# Patient Record
Sex: FEMALE | Race: BLACK OR AFRICAN AMERICAN
[De-identification: names, ages, dates, MRNs, and addresses within clinical notes are randomized per-mention and may not be internally consistent; named-entity substitution may affect disease eponyms.]

---

## 2022-11-06 ENCOUNTER — HOSPITAL ENCOUNTER (EMERGENCY)
Dept: HOSPITAL 97 - ER | Age: 22
Discharge: HOME | End: 2022-11-06
Payer: SELF-PAY

## 2022-11-06 VITALS — SYSTOLIC BLOOD PRESSURE: 104 MMHG | DIASTOLIC BLOOD PRESSURE: 76 MMHG

## 2022-11-06 VITALS — TEMPERATURE: 98 F

## 2022-11-06 VITALS — OXYGEN SATURATION: 100 %

## 2022-11-06 DIAGNOSIS — R63.0: ICD-10-CM

## 2022-11-06 DIAGNOSIS — E86.0: Primary | ICD-10-CM

## 2022-11-06 LAB
ALBUMIN SERPL BCP-MCNC: 5 G/DL (ref 3.4–5)
ALP SERPL-CCNC: 61 U/L (ref 45–117)
ALT SERPL W P-5'-P-CCNC: 18 U/L (ref 12–78)
AST SERPL W P-5'-P-CCNC: 12 U/L (ref 15–37)
BUN BLD-MCNC: 7 MG/DL (ref 7–18)
GLUCOSE SERPLBLD-MCNC: 81 MG/DL (ref 74–106)
HCT VFR BLD CALC: 47.6 % (ref 36–45)
LYMPHOCYTES # SPEC AUTO: 2 K/UL (ref 0.7–4.9)
MCV RBC: 85.7 FL (ref 80–100)
PMV BLD: 8.9 FL (ref 7.6–11.3)
POTASSIUM SERPL-SCNC: 3.7 MMOL/L (ref 3.5–5.1)
RBC # BLD: 5.55 M/UL (ref 3.86–4.86)
TSH SERPL DL<=0.05 MIU/L-ACNC: 1.77 UIU/ML (ref 0.36–3.74)

## 2022-11-06 PROCEDURE — 87088 URINE BACTERIA CULTURE: CPT

## 2022-11-06 PROCEDURE — 81003 URINALYSIS AUTO W/O SCOPE: CPT

## 2022-11-06 PROCEDURE — 87186 SC STD MICRODIL/AGAR DIL: CPT

## 2022-11-06 PROCEDURE — 87086 URINE CULTURE/COLONY COUNT: CPT

## 2022-11-06 PROCEDURE — 81015 MICROSCOPIC EXAM OF URINE: CPT

## 2022-11-06 PROCEDURE — 84443 ASSAY THYROID STIM HORMONE: CPT

## 2022-11-06 PROCEDURE — 36415 COLL VENOUS BLD VENIPUNCTURE: CPT

## 2022-11-06 PROCEDURE — 87804 INFLUENZA ASSAY W/OPTIC: CPT

## 2022-11-06 PROCEDURE — 99284 EMERGENCY DEPT VISIT MOD MDM: CPT

## 2022-11-06 PROCEDURE — 96360 HYDRATION IV INFUSION INIT: CPT

## 2022-11-06 PROCEDURE — 85025 COMPLETE CBC W/AUTO DIFF WBC: CPT

## 2022-11-06 PROCEDURE — 85652 RBC SED RATE AUTOMATED: CPT

## 2022-11-06 PROCEDURE — 87077 CULTURE AEROBIC IDENTIFY: CPT

## 2022-11-06 PROCEDURE — 96361 HYDRATE IV INFUSION ADD-ON: CPT

## 2022-11-06 PROCEDURE — 80053 COMPREHEN METABOLIC PANEL: CPT

## 2022-11-06 NOTE — XMS REPORT
Continuity of Care Document

                           Created on:2022



Patient:MICHA CASTILLO

Sex:Female

:2000

External Reference #:[id number]





Demographics







                          Address                   [address]



                                                    [place] [address]

 

                          Home Phone                [phone]

 

                          Email Address             [address]

 

                          Preferred Language        English

 

                          Marital Status            Unknown

 

                          Temple Affiliation     Unknown

 

                          Race                      Unknown

 

                          Additional Race(s)        Unavailable

 

                          Ethnic Group              Unknown









Author







                          Organization              [facility]

t

 

                          Address                   [address] [place] [address] [address]



                                                    [place] [address]

 

                          Phone                     [phone]









Support







                Name            Relationship    Address         Phone

 

                FREDY MELO     Grandparent     [address]        Unavailable



                                                [place] [address] 









Care Team Providers







                    Name                Role                Phone

 

                    PCP, PATIENT DOES NOT HAVE A Primary Care Physician Unavaila

kaila Sevilla          Attending Clinician Unavailable

 

                    DAISY ROGERS     Attending Clinician Unavailable

 

                    Di          Admitting Clinician Unavailable









Payers







           Payer Name Policy Type Policy Number Effective Date Expiration Date S

ource







Problems







       Condition Condition Condition Status Onset  Resolution Last   Treating Co

mments 

Source



       Name   Details Category        Date   Date   Treatment Clinician        



                                                 Date                 

 

       Eczema Eczema Problem Active                              [facility]



                                   9-15                               da



                                   00:00:                             [facility]



                                   00                                 al



                                                                      Health



                                                                      [facility]



                                                                      h



                                                                      Program







Allergies, Adverse Reactions, Alerts







       Allergy Allergy Status Severity Reaction(s) Onset  Inactive Treating Comm

ents 

Source



       Name   Type                        Date   Date   Clinician        

 

       NO KNOWN Drug   Active                                           [facility]



       ALLERGIE Class                                                   [facility]







Social History







                Smoking Status  Start Date      Stop Date       Source

 

                Never Smoker                                    [facility]

[facility]







Medications







       Ordered Filled Start  Stop   Current Ordering Indication Dosage Frequency

 Signature

                    Comments            Components          Source



     Medication Medication Date Date Medication? Clinician                (SIG) 

          



     Name Name                                                   

 

     hydrocortis hydrocortis           No                       hydrocorti      

     [facility]



     one 1 % one 1 %                                    sone 1 %           da



     topical topical                                    topical           [facility]

p



     cream APPLY cream APPLY                                    cream           

al



     THIN LAYER THIN LAYER                                    APPLY THIN        

   Health



     TOPICALLY TOPICALLY                                    LAYER           Outr

eac



     TO THE TO THE                                    TOPICALLY           h



     AFFECTED AFFECTED                                    TO THE           Progr

am



     AREA TWICE AREA TWICE                                    AFFECTED          

 



     DAILY DAILY                                    AREA TWICE           



                                                  DAILY           

 

     mirtazapine mirtazapine           No                       mirtazapin      

     [facility]



     30 mg 30 mg                                    e 30 mg           da



     tablet TAKE tablet TAKE                                    tablet          

 [facility]



     1 TABLET BY 1 TABLET BY                                    TAKE 1          

 al



     MOUTH EVERY MOUTH EVERY                                    TABLET BY       

    Health



     DAY AT DAY AT                                    MOUTH           [facility]



     BEDTIME BEDTIME                                    EVERY DAY           h



                                                  AT BEDTIME           Program

 

     triamcinolo triamcinolo           No                       triamcinol      

     [facility]



     ne   ne                                      one            da



     acetonide acetonide                                    acetonide           

[facility]



     0.1 % 0.1 %                                    0.1 %           al



     topical topical                                    topical           Health



     ointment ointment                                    ointment           Out

reac



     APPLY A APPLY A                                    APPLY A           h



     THIN LAYER THIN LAYER                                    THIN LAYER        

   Program



     TO THE TO THE                                    TO THE           



     AFFECTED AFFECTED                                    AFFECTED           



     AREA(S) BY AREA(S) BY                                    AREA(S) BY        

   



     TOPICAL TOPICAL                                    TOPICAL           



     ROUTE 2 ROUTE 2                                    ROUTE 2           



     TIMES PER TIMES PER                                    TIMES PER           



     DAY use DAY use                                    DAY use           



     only if only if                                    only if           



     hydrocortis hydrocortis                                    hydrocorti      

     



     one not one not                                    sone not           



     effective effective                                    effective           

 

     Abilify 5 Abilify 5           No             1    Q1D  Abilify 5           

[facility]



     mg tablet mg tablet                                    mg tablet           

da



     Take 1 Take 1                                    Take 1           [facility]



     tablet tablet                                    tablet           al



     every day every day                                    every day           

Health



     by oral by oral                                    by oral           [facility]

c



     route in route in                                    route in           h



     the morning the morning                                    the            P

rogram



     for 30 for 30                                    morning           



     days. days.                                    for 30           



                                                  days.           

 

     fluconazole fluconazole           No                       fluconazol      

     [facility]



     150 mg 150 mg                                    e 150 mg           da



     tablet TAKE tablet TAKE                                    tablet          

 [facility]



     1 TABLET BY 1 TABLET BY                                    TAKE 1          

 al



     MOUTH EVERY MOUTH EVERY                                    TABLET BY       

    Health



     OTHER DAY OTHER DAY                                    MOUTH           Outr

eac



     FOR 4 DAYS FOR 4 DAYS                                    EVERY           h



                                                  OTHER DAY           Program



                                                  FOR 4 DAYS           

 

     hydrocortis hydrocortis           No                       hydrocorti      

     [facility]



     one 1 % one 1 %                                    sone 1 %           da



     topical topical                                    topical           [facility]

p



     cream APPLY cream APPLY                                    cream           

al



     THIN LAYER THIN LAYER                                    APPLY THIN        

   Health



     TOPICALLY TOPICALLY                                    LAYER           Outr

eac



     TO THE TO THE                                    TOPICALLY           h



     AFFECTED AFFECTED                                    TO THE           Progr

am



     AREA TWICE AREA TWICE                                    AFFECTED          

 



     DAILY DAILY                                    AREA TWICE           



                                                  DAILY           

 

     mirtazapine mirtazapine           No                       mirtazapin      

     [facility]



     30 mg 30 mg                                    e 30 mg           da



     tablet TAKE tablet TAKE                                    tablet          

 [facility]



     1 TABLET BY 1 TABLET BY                                    TAKE 1          

 al



     MOUTH EVERY MOUTH EVERY                                    TABLET BY       

    Health



     DAY AT DAY AT                                    MOUTH           [facility]



     BEDTIME BEDTIME                                    EVERY DAY           h



                                                  AT BEDTIME           Program

 

     triamcinolo triamcinolo           No                       triamcinol      

     [facility]



     ne   ne                                      one            da



     acetonide acetonide                                    acetonide           

[facility]



     0.1 % 0.1 %                                    0.1 %           al



     topical topical                                    topical           Health



     ointment ointment                                    ointment           Out

reac



     APPLY A APPLY A                                    APPLY A           h



     THIN LAYER THIN LAYER                                    THIN LAYER        

   Program



     TO THE TO THE                                    TO THE           



     AFFECTED AFFECTED                                    AFFECTED           



     AREA(S) BY AREA(S) BY                                    AREA(S) BY        

   



     TOPICAL TOPICAL                                    TOPICAL           



     ROUTE 2 ROUTE 2                                    ROUTE 2           



     TIMES PER TIMES PER                                    TIMES PER           



     DAY use DAY use                                    DAY use           



     only if only if                                    only if           



     hydrocortis hydrocortis                                    hydrocorti      

     



     one not one not                                    sone not           



     effective effective                                    effective           

 

     hydrocortis hydrocortis           No                       hydrocorti      

     [facility]



     one 1 % one 1 %                                    sone 1 %           da



     topical topical                                    topical           [facility]

p



     cream APPLY cream APPLY                                    cream           

al



     A THIN A THIN                                    APPLY A           Health



     LAYER TO LAYER TO                                    THIN LAYER           O

[facility]



     THE  THE                                     TO THE           h



     AFFECTED AFFECTED                                    AFFECTED           Pro

gram



     AREA(S) BY AREA(S) BY                                    AREA(S) BY        

   



     TOPICAL TOPICAL                                    TOPICAL           



     ROUTE 2 ROUTE 2                                    ROUTE 2           



     TIMES PER TIMES PER                                    TIMES PER           



     DAY  DAY                                     DAY            

 

     triamcinolo triamcinolo           No                       triamcinol      

     [facility]



     ne   ne                                      one            da



     acetonide acetonide                                    acetonide           

[facility]



     0.1 % 0.1 %                                    0.1 %           al



     topical topical                                    topical           Health



     ointment ointment                                    ointment           Out

reac



     APPLY A APPLY A                                    APPLY A           h



     THIN LAYER THIN LAYER                                    THIN LAYER        

   Program



     TO THE TO THE                                    TO THE           



     AFFECTED AFFECTED                                    AFFECTED           



     AREA(S) BY AREA(S) BY                                    AREA(S) BY        

   



     TOPICAL TOPICAL                                    TOPICAL           



     ROUTE 2 ROUTE 2                                    ROUTE 2           



     TIMES PER TIMES PER                                    TIMES PER           



     DAY use DAY use                                    DAY use           



     only if only if                                    only if           



     hydrocortis hydrocortis                                    hydrocorti      

     



     one not one not                                    sone not           



     effective effective                                    effective           

 

     hydrocortis hydrocortis           No                       hydrocorti      

     [facility]



     one 1 % one 1 %                                    sone 1 %           da



     topical topical                                    topical           [facility]

p



     cream APPLY cream APPLY                                    cream           

al



     THIN LAYER THIN LAYER                                    APPLY THIN        

   Health



     TOPICALLY TOPICALLY                                    LAYER           Outr

eac



     TO THE TO THE                                    TOPICALLY           h



     AFFECTED AFFECTED                                    TO THE           Progr

am



     AREA TWICE AREA TWICE                                    AFFECTED          

 



     DAILY DAILY                                    AREA TWICE           



                                                  DAILY           

 

     mirtazapine mirtazapine           No             1    Q1D  mirtazapin      

     [facility]



     30 mg 30 mg                                    e 30 mg           da



     tablet Take tablet Take                                    tablet          

 [facility]



     1 tablet 1 tablet                                    Take 1           al



     every day every day                                    tablet           Hea

lth



     by oral by oral                                    every day           Outr

eac



     route at route at                                    by oral           h



     bedtime for bedtime for                                    route at        

   Program



     30 days. 30 days.                                    bedtime           



                                                  for 30           



                                                  days.           

 

     triamcinolo triamcinolo           No                       triamcinol      

     [facility]



     ne   ne                                      one            da



     acetonide acetonide                                    acetonide           

[facility]



     0.1 % 0.1 %                                    0.1 %           al



     topical topical                                    topical           Health



     ointment ointment                                    ointment           Out

reac



     APPLY A APPLY A                                    APPLY A           h



     THIN LAYER THIN LAYER                                    THIN LAYER        

   Program



     TO THE TO THE                                    TO THE           



     AFFECTED AFFECTED                                    AFFECTED           



     AREA(S) BY AREA(S) BY                                    AREA(S) BY        

   



     TOPICAL TOPICAL                                    TOPICAL           



     ROUTE 2 ROUTE 2                                    ROUTE 2           



     TIMES PER TIMES PER                                    TIMES PER           



     DAY use DAY use                                    DAY use           



     only if only if                                    only if           



     hydrocortis hydrocortis                                    hydrocorti      

     



     one not one not                                    sone not           



     effective effective                                    effective           

 

     hydrocortis hydrocortis           No                       hydrocorti      

     [facility]



     one 1 % one 1 %                                    sone 1 %           da



     topical topical                                    topical           [facility]

p



     cream APPLY cream APPLY                                    cream           

al



     THIN LAYER THIN LAYER                                    APPLY THIN        

   Health



     TOPICALLY TOPICALLY                                    LAYER           Outr

eac



     TO THE TO THE                                    TOPICALLY           h



     AFFECTED AFFECTED                                    TO THE           Progr

am



     AREA TWICE AREA TWICE                                    AFFECTED          

 



     DAILY DAILY                                    AREA TWICE           



                                                  DAILY           

 

     mirtazapine mirtazapine           No                       mirtazapin      

     [facility]



     30 mg 30 mg                                    e 30 mg           da



     tablet TAKE tablet TAKE                                    tablet          

 [facility]



     1 TABLET BY 1 TABLET BY                                    TAKE 1          

 al



     MOUTH EVERY MOUTH EVERY                                    TABLET BY       

    Health



     DAY AT DAY AT                                    MOUTH           [facility]



     BEDTIME BEDTIME                                    EVERY DAY           h



                                                  AT BEDTIME           Program

 

     triamcinolo triamcinolo           No                       triamcinol      

     [facility]



     ne   ne                                      one            da



     acetonide acetonide                                    acetonide           

[facility]



     0.1 % 0.1 %                                    0.1 %           al



     topical topical                                    topical           Health



     ointment ointment                                    ointment           Out

reac



     APPLY A APPLY A                                    APPLY A           h



     THIN LAYER THIN LAYER                                    THIN LAYER        

   Program



     TO THE TO THE                                    TO THE           



     AFFECTED AFFECTED                                    AFFECTED           



     AREA(S) BY AREA(S) BY                                    AREA(S) BY        

   



     TOPICAL TOPICAL                                    TOPICAL           



     ROUTE 2 ROUTE 2                                    ROUTE 2           



     TIMES PER TIMES PER                                    TIMES PER           



     DAY use DAY use                                    DAY use           



     only if only if                                    only if           



     hydrocortis hydrocortis                                    hydrocorti      

     



     one not one not                                    sone not           



     effective effective                                    effective           

 

     Diflucan Diflucan           No             1    Q2D  Diflucan           Mat

[facility]



     150 mg 150 mg                                    150 mg           da



     tablet Take tablet Take                                    tablet          

 [facility]



     1 tablet 1 tablet                                    Take 1           al



     every other every other                                    tablet          

 Health



     day by oral day by oral                                    every           

[facility]



     route for 4 route for 4                                    other day       

    h



     days. days.                                    by oral           Program



                                                  route for           



                                                  4 days.           







Vital Signs







             Vital Name   Observation Time Observation Value Comments     Source

 

             BP Systolic  2022-10-11 00:00:00 102 mm[Hg]                [facility]



                                                                 [facility]



                                                                 Program

 

             Body Weight  2022-10-11 00:00:00 117 [facility]_av]               [facility]



                                                                 [facility]



                                                                 Program

 

             BP Diastolic 2022-10-11 00:00:00 74 mm[facility]Hg]                 [facility]

[facility]



                                                                 [facility]



                                                                 Program

 

             Height       2022-10-11 00:00:00 65 [in_i]                 [facility]

[facility]



                                                                 [facility]



                                                                 Program

 

             BMI (Body Mass 2022-10-11 00:00:00 19.5 kg/m2                [facility]

[facility]



             Index)                                              [facility] Outreach



                                                                 Program

 

             BP Diastolic 2022-10-05 00:00:00 70 mm[facility]Hg]                 [facility]



                                                                 [facility]



                                                                 Program

 

             Height       2022-10-05 00:00:00 65 [in_i]                 [facility]

[facility]



                                                                 [facility]



                                                                 Program

 

             BMI (Body Mass 2022-10-05 00:00:00 20 kg/m2                  [facility]

[facility]



             Index)                                              [facility] Outreach



                                                                 Program

 

             BP Systolic  2022-10-05 00:00:00 101 mm[Hg]                [facility]

[facility]



                                                                 Health Outreach



                                                                 Program

 

             Body Weight  2022-10-05 00:00:00 1920 [[facility]av]              [facility]



                                                                 [facility]



                                                                 Program

 

             BP Diastolic 2022 00:00:00 79 mm[Hg]                 [facility]



                                                                 [facility]



                                                                 Program

 

             Height       2022 00:00:00 65 [in_i]                 [facility]



                                                                 [facility]



                                                                 Program

 

             BMI (Body Mass 2022 00:00:00 19.1 kg/m2                [facility]

[facility]



             Index)                                              Health Outreach



                                                                 Program

 

             BP Systolic  2022 00:00:00 114 mm[Hg]                [facility]



                                                                 [facility]



                                                                 Program

 

             Body Weight  2022 00:00:00 1840 [oz_av]              [facility]



                                                                 [facility]



                                                                 Program







Procedures







                Procedure       Date / Time Performed Performing Clinician Sourc

e

 

                US, pelvis, complete 2022-10-11 00:00:00                 Kingsley magdaleno [facility]



                                                                Health Outreach



                                                                Program

 

                US, thyroid     2022-10-05 00:00:00                 Debbi [facility]

[facility]



                                                                Health Outreach



                                                                Program







Plan of Care







             Planned Activity Planned Date Details      Comments     Source

 

             Diagnostic Test 2022-10-11   HCG, intact + beta              [facility]

rda



             Pending      00:00:00     subunit, quant,              [facility]

alth



                                       serum or plasma              Outreach Pro

gram



                                       [code = HCG, intact              



                                       + beta subunit,              



                                       quant, serum or              



                                       plasma]                   

 

             Diagnostic Test 2022-10-11   prolactin, serum              [place]

a



             Pending      00:00:00     [code = prolactin,              [facility]

 Health



                                       serum]                    [facility] Progra

m

 

             Diagnostic Test 2022-10-11   TSH + free T4, serum              [facility]

asa



             Pending      00:00:00     [code = TSH + free              [facility]

 Health



                                       T4, serum]                [facility]

m

 

             Diagnostic Test 2022-10-11   HbA1c (hemoglobin              [place]

da



             Pending      00:00:00     A1c), blood [code =              [facility]

l [facility]



                                       HbA1c (hemoglobin              Outreach P

rogram



                                       A1c), blood]              

 

             Diagnostic Test 2022-10-11   dhea-sulfate, serum              [place]

[place]



             Pending      00:00:00     [code =                   [facility]

h



                                       dhea-sulfate, serum]              [facility]

h Program

 

             Diagnostic Test 2022-10-11   insulin, serum [code              [facility]

asa



             Pending      00:00:00     = insulin, serum]              [facility] 

Health



                                                                 [facility]gra

m

 

             Diagnostic Test 2022-10-11   testosterone, free +              [facility]

asa



             Pending      00:00:00     total, serum [code =              [facility]

al Health



                                       testosterone, free +              [facility]

h Program



                                       total, serum]              

 

             Diagnostic Test 2022-10-11   HIV 1 + 2,                [facility]



             Pending      00:00:00     meaningful use set              [facility]

 [facility]



                                       [code = HIV 1 + 2,              Outreach 

Program



                                       meaningful use set]              

 

             Future Appointment 2022   Nathan Cardona, [address]              [place]

[place]



                          00:00:00     [place] [address]              Outreach

 Program







Encounters







        Start   End     Encounter Admission Attending Care    Care    Encounter 

Source



        Date/Time Date/Time Type    Type    Clinicians Facility Department ID   

   

 

        2022 Outpatient         [facility]   1205

 [place]



        00:00:00 00:00:00                                         [id number]   da



                                                                        [facility]



                                                                        al



                                                                        Health



                                                                        Outreac



                                                                        h



                                                                        Program

 

        2022-10-27 2022-10-27 Outpatient         Ngen[facility]o [facility]   1205

34 [place]



        00:00:00 00:00:00                                         [id number]   da



                                                                        [facility]



                                                                        al



                                                                        Health



                                                                        Outreac



                                                                        h



                                                                        Program

 

        2022-10-27 2022-10-27 Nathan SHELBY                 [facility]   [place] -    [id number] 

[place]



        00:00:00 00:00:00 MD Brendan:                         Debbi conley



                        [address]                            [facility]         [facility]

p



                        [place]                          HOP - MEHOP         al



                        [facility]



                        [address]                                         h



                        , Ph.                                           Program



                        ([phone])                                           



                        [phone]                                         

 

        2022-10-11 2022-10-11 Outpatient         Nguyen_Annieo MEHOP   MEHOP   1205

 [place]



        00:00:00 00:00:00                                         [id number]   da



                                                                        [facility]



                                                                        al



                                                                        Health



                                                                        Outreac



                                                                        



                                                                        Program

 

        2022-10-11 2022-10-11 Kelsi                 [facility]   [place] -    [id number] M

[place]



        00:00:00 00:00:00 Josefina                             Debbi Bedoya,                         [facility]         [facility]

p



                        NP: [address]                         HOP - MEHOP         al



                        [address],                         OB GYN [facility]



                        [address]                                         [place] gu[place]



                        , Ph.                                           



                        ([phone])                                           



                        [phone]                                         

 

        2022-10-05 2022-10-05 Outpatient         Nguyen_Annieo MEHOP   MEHOP   1205

 [place]



        00:00:00 00:00:00                                         [id number]   da



                                                                        [facility]



                                                                        al



                                                                        Health



                                                                        [facility]



                                                                        [facility]

 

        2022-10-05 2022-10-05 Heath                     [facility]   [place] -    [id number] M

[place]



        00:00:00 00:00:00 Debbi Gallegos



                        APRN-NP-C:                         [facility]         Epi

scop



                        [address]                            [facility] - MEHOP         [facility]



                        [address]                                         [place]





                        , Ph.                                           



                        ([phone])                                           



                        2022 Outpatient         Nguyen_Tho MEHOP   MEHOP   1205

 [place]



        00:00:00 00:00:00                                         [id number]   da



                                                                        [facility]



                                                                        al



                                                                        Health



                                                                        Outreac



                                                                        



                                                                        Program

 

        [facility]   [place] [facility]    [id number] 

[place]



        00:00:00 00:00:00 MD Brendan:                         Debbi conley



                        17[address]                            [facility]         [facility]

p



                        [place]                          HOP - MEHOP         al



                        [facility]



                        [address]                                         h



                        , Ph.                                           Program



                        ([phone])                                           



                        2022 Outpatient         [facility]   [address]

 [place]



        00:00:00 00:00:00                                         [id number]   da



                                                                        [facility]



                                                                        [facility]



                                                                        Program

 

        2022 [facility]    [id number] M

[place]



        00:00:00 00:00:00 Debbi Gallegos         da



                        APRN-NP-C:                         [facility]         Epi

scop



                        [address]                            [facility]



                        Ave, [facility]



                        [address]                                         [facility]



                        , Ph.                                           



                        ([phone])                                           



                        2022 Outpatient         [facility]   1205

 [place]



        00:00:00 00:00:00                                         [id number]   da



                                                                        [facility]



                                                                        [facility]



                                                                        Program

 

        2022 Outpatient         [facility]   1205

202 [place]



        00:00:00 00:00:00                                         [id number]   da



                                                                        [facility]



                                                                        [facility]



                                                                        Program

 

        2022 Emergency X       DAISY ROGERS [facility]    ERT     [id number]

[id number] [facility]



        21:40:00 01:50:00                                                 [facility]







Results







           Test Description Test Time  Test Comments Results    Result Comments 

Source









                    Free T4 and TSH panel - Serum or Plasma 2022-10-12 00:00:00 









                      Test Item  Value      Reference Range Interpretation Comme

nts









             Thyrotropin [Units/volume] in Serum or Plasma by 0.544 uIU/mL 0.450

-4.500               



             Detection limit <= 0.005 mIU/L (test code = 34098-1)               

                         

 

             Thyroxine (T4) free [Mass/volume] in Serum or Plasma 0.97 NG/dL   0

.82-1.77                 



             (test code = 3024-7)                                        



[facility] Program[facility] total panel 
[Mass/volume] - Serum or Plasma2022-10-12 00:00:00





             Test Item    Value        Reference Range Interpretation Comments

 

             Testosterone [Mass/volume] in Serum 26 NG/dL     13-71             

        



             or Plasma (test code = 2986-8)                                     

   

 

             Testosterone Free [Mass/volume] in 1.6 pg/mL    0.0-4.2            

       



             Serum or Plasma (test code =                                       

 



             2991-8)                                             



[facility] 
Blood2022-10-12 00:00:00





             Test Item    Value        Reference Range Interpretation Comments

 

             Hemoglobin A1c/Hemoglobin.total in 5.3 %        4.8-5.6            

       



             Blood (test code = 4548-4)                                        



[facility]andDehydroepiandrosterone sulfate (DHEA-
S) [Mass/volume] in Serum or Plasma2022-10-12 00:00:00





             Test Item    Value        Reference Range Interpretation Comments

 

             Dehydroepiandrosterone sulfate 205.0 ug/dL  110.0-431.7            

   



             (DHEA-S) [Mass/volume] in Serum                                    

    



             or Plasma (test code = 2191-5)                                     

   



[facility]andChoriogonadotropin.intact+Beta 
subunit [Units/volume] in Serum or Plasma2022-10-12 00:00:00





             Test Item    Value        Reference Range Interpretation Comments

 

             Choriogonadotropin.intact+Beta subunit <1                          

           



             [Units/volume] in Serum or Plasma (test                            

            



             code = 72423-1)                                        



[facility]andProlactin [Mass/volume] in Serum or 
Plasma2022-10-12 00:00:00





             Test Item    Value        Reference Range Interpretation Comments

 

             Prolactin [Mass/volume] in Serum 14.7 NG/mL   4.8-23.3             

     



             or Plasma (test code = 2842-3)                                     

   



[facility]andHIV 1 and 2 tests - Meaningful Use 
set2022-10-12 00:00:00





             Test Item    Value        Reference Range Interpretation Comments

 

             HIV 1+2 Ab+HIV1 p24 Ag non reactive non reactive              



             [Presence] in Serum or Plasma by                                   

     



             Immunoassay (test code =                                        



             36484-4)                                            



[facility]andInsulin [Units/volume] in Serum or 
Plasma2022-10-12 00:00:00





             Test Item    Value        Reference Range Interpretation Comments

 

             Insulin [Units/volume] in Serum or 2.3 uIU/mL   2.6-24.9     L     

       



             Plasma (test code = 31925-1)                                       

 



[facility]regnancy test, urine2022-10-05 
14:34:48





             Test Item    Value        Reference Range Interpretation Comments

 

             HCG (test code = HCG) negative                               



[facility] Programpregnancy test, urine2022-10-05 
14:34:48





             Test Item    Value        Reference Range Interpretation Comments

 

             HCG (test code = HCG) negative                               



[facility] Programpregnancy test, urine2022-10-05 
14:34:48





             Test Item    Value        Reference Range Interpretation Comments

 

             HCG (test code = HCG) negative                               



[facility] ProgramUrinalysis macro (dipstick) panel - 
Urine2022-10-05 14:34:43





             Test Item    Value        Reference Range Interpretation Comments

 

             Leukocytes (test code = Leukocytes) -                              

        

 

             Nitrite (test code = Nitrite) -                                    

  

 

             Urobilinogen (test code = -                                      



             Urobilinogen)                                        

 

             Protein (test code = Protein) -                                    

  

 

             pH (test code = pH) 8.0                                    

 

             Blood (test code = Blood) -                                      

 

             Specific Gravity (test code = Specific 1.015                       

           



             Gravity)                                            

 

             Ketone (test code = Ketone) -                                      

 

             Bilirubin (test code = Bilirubin) -                                

      

 

             Glucose (test code = Glucose) -                                    

  

 

             Appearance (test code = Appearance) clear                          

        

 

             Color (test code = Color) yellow                                 



[facility]andUrinalysis macro (dipstick) panel - 
Urine2022-10-05 14:34:43





             Test Item    Value        Reference Range Interpretation Comments

 

             Leukocytes (test code = Leukocytes) -                              

        

 

             Nitrite (test code = Nitrite) -                                    

  

 

             Urobilinogen (test code = -                                      



             Urobilinogen)                                        

 

             Protein (test code = Protein) -                                    

  

 

             pH (test code = pH) 8.0                                    

 

             Blood (test code = Blood) -                                      

 

             Specific Gravity (test code = Specific 1.015                       

           



             Gravity)                                            

 

             Ketone (test code = Ketone) -                                      

 

             Bilirubin (test code = Bilirubin) -                                

      

 

             Glucose (test code = Glucose) -                                    

  

 

             Appearance (test code = Appearance) clear                          

        

 

             Color (test code = Color) yellow                                 



[facility]and[facility]inalysis macro (dipstick) panel - 
Urine2022-10-05 14:34:43





             Test Item    Value        Reference Range Interpretation Comments

 

             Leukocytes (test code = Leukocytes) -                              

        

 

             Nitrite (test code = Nitrite) -                                    

  

 

             Urobilinogen (test code = -                                      



             Urobilinogen)                                        

 

             Protein (test code = Protein) -                                    

  

 

             pH (test code = pH) 8.0                                    

 

             Blood (test code = Blood) -                                      

 

             Specific Gravity (test code = Specific 1.015                       

           



             Gravity)                                            

 

             Ketone (test code = Ketone) -                                      

 

             Bilirubin (test code = Bilirubin) -                                

      

 

             Glucose (test code = Glucose) -                                    

  

 

             Appearance (test code = Appearance) clear                          

        

 

             Color (test code = Color) yellow                                 



[facility] Auto Differential panel - Blood
2022 00:00:00





             Test Item    Value        Reference Range Interpretation Comments

 

             Leukocytes [#/volume] in Blood 4.3 x10e3/uL 3.4-10.8               

   



             by Automated count (test code =                                    

    



             6690-2)                                             

 

             Erythrocytes [#/volume] in 4.86 x10e6/uL 3.77-5.28                 



             Blood by Automated count (test                                     

   



             code = 789-8)                                        

 

             Hemoglobin [Mass/volume] in 13.4 g/dL    11.1-15.9                 



             Blood (test code = 718-7)                                        

 

             Hematocrit [Volume Fraction] of 42.5 %       34.0-46.6             

    



             Blood by Automated count (test                                     

   



             code = 4544-3)                                        

 

             Erythrocyte mean corpuscular 87 fL        79-97                    

 



             volume [Entitic volume] by                                        



             Automated count (test code =                                       

 



             787-2)                                              

 

             Erythrocyte mean corpuscular 27.6 pg      26.6-33.0                

 



             hemoglobin [Entitic mass] by                                       

 



             Automated count (test code =                                       

 



             785-6)                                              

 

             Erythrocyte mean corpuscular 31.5 g/dL    31.5-35.7                

 



             hemoglobin concentration                                        



             [Mass/volume] by Automated                                        



             count (test code = 786-4)                                        

 

             Erythrocyte distribution width 13.5 %       11.7-15.4              

   



             [Ratio] by Automated count                                        



             (test code = 788-0)                                        

 

             Platelets [#/volume] in Blood 211 x10e3/uL 150-450                 

  



             by Automated count (test code =                                    

    



             777-3)                                              

 

             Neutrophils/100 leukocytes in 44 %         not estab.              

  



             Blood by Automated count (test                                     

   



             code = 770-8)                                        

 

             Lymphocytes/100 leukocytes in 39 %         not estab.              

  



             Blood by Automated count (test                                     

   



             code = 736-9)                                        

 

             Monocytes/100 leukocytes in 8 %          not estab.                



             Blood by Automated count (test                                     

   



             code = 5905-5)                                        

 

             Eosinophils/100 leukocytes in 8 %          not estab.              

  



             Blood by Automated count (test                                     

   



             code = 713-8)                                        

 

             Basophils/100 leukocytes in 1 %          not estab.                



             Blood by Automated count (test                                     

   



             code = 706-2)                                        

 

             immature cells (test code = np                                     



             immature cells)                                        

 

             Neutrophils [#/volume] in Blood 1.9 x10e3/uL 1.4-7.0               

    



             by Automated count (test code =                                    

    



             751-8)                                              

 

             Lymphocytes [#/volume] in Blood 1.7 x10e3/uL 0.7-3.1               

    



             by Automated count (test code =                                    

    



             731-0)                                              

 

             Monocytes [#/volume] in Blood 0.4 x10e3/uL 0.1-0.9                 

  



             by Automated count (test code =                                    

    



             742-7)                                              

 

             Eosinophils [#/volume] in Blood 0.3 x10e3/uL 0.0-0.4               

    



             by Automated count (test code =                                    

    



             711-2)                                              

 

             Basophils [#/volume] in Blood 0.0 x10e3/uL 0.0-0.2                 

  



             by Automated count (test code =                                    

    



             704-7)                                              

 

             Immature granulocytes/100 0 %          not estab.                



             leukocytes in Blood by                                        



             Automated count (test code =                                       

 



             62420-8)                                            

 

             Immature granulocytes 0.0 x10e3/uL 0.0-0.1                   



             [#/volume] in Blood by                                        



             Automated count (test code =                                       

 



             65596-4)                                            

 

             Nucleated erythrocytes/100 np                                     



             leukocytes [Ratio] in Blood by                                     

   



             Automated count (test code =                                       

 



             74869-9)                                            

 

             Morphology [Interpretation] in np                                  

   



             Blood Narrative (test code =                                       

 



             74818-9)                                            



[facility] - 
Serum or [id number] 00:00:00





             Test Item    Value        Reference Range Interpretation Comments

 

             Glucose [Mass/volume] in 70 mg/dL     65-99                     



             Serum or Plasma (test code =                                       

 



             2345-7)                                             

 

             Urea nitrogen [Mass/volume] 12 mg/dL     6-20                      



             in Serum or Plasma (test code                                      

  



             = 3094-0)                                           

 

             Creatinine [Mass/volume] in 0.75 mg/dL   0.57-1.00                 



             Serum or Plasma (test code =                                       

 



             2160-0)                                             

 

             eGFR (test code = eGFR) 115 mL/min/1.73 >59                       

 

             Urea nitrogen/Creatinine 16           9-23                      



             [Mass Ratio] in Serum or                                        



             Plasma (test code = 3097-3)                                        

 

             Sodium [Moles/volume] in 138 mmol/L   134-144                   



             Serum or Plasma (test code =                                       

 



             2951-2)                                             

 

             Potassium [Moles/volume] in 4.2 mmol/L   3.5-5.2                   



             Serum or Plasma (test code =                                       

 



             2823-3)                                             

 

             Chloride [Moles/volume] in 101 mmol/L                       



             Serum or Plasma (test code =                                       

 



             -0)                                             

 

             Carbon dioxide, total 23 mmol/L    20-29                     



             [Moles/volume] in Serum or                                        



             Plasma (test code = -)                                        

 

             Calcium [Mass/volume] in 9.5 mg/dL    8.7-10.2                  



             Serum or Plasma (test code =                                       

 



             69695-8)                                            

 

             Protein [Mass/volume] in 7.4 g/dL     6.0-8.5                   



             Serum or Plasma (test code =                                       

 



             2885-2)                                             

 

             Albumin [Mass/volume] in 4.7 g/dL     3.9-5.0                   



             Serum or Plasma (test code =                                       

 



             175-7)                                             

 

             Globulin [Mass/volume] in 2.7 g/dL     1.5-4.5                   



             Serum by calculation (test                                        



             code = 27193-9)                                        

 

             Albumin/Globulin [Mass Ratio] 1.7          1.2-2.2                 

  



             in Serum or Plasma (test code                                      

  



             = 1759-0)                                           

 

             Bilirubin.total [Mass/volume] 1.5 mg/dL    0.0-1.2      H          

  



             in Serum or Plasma (test code                                      

  



             = -)                                           

 

             Alkaline phosphatase 51 IU/L                          



             [Enzymatic activity/volume]                                        



             in Serum or Plasma (test code                                      

  



             = 6768-6)                                           

 

             Aspartate aminotransferase 11 IU/L      0-40                      



             [Enzymatic activity/volume]                                        



             in Serum or Plasma (test code                                      

  



             = 1920-8)                                           

 

             Alanine aminotransferase 8 IU/L       0-32                      



             [Enzymatic activity/volume]                                        



             in Serum or Plasma (test code                                      

  



             = 1742-6)                                           



[facility]
2022 00:00:00





             Test Item    Value        Reference Range Interpretation Comments

 

             Cholesterol [Mass/volume] in Serum 183 mg/dL    100-199            

       



             or Plasma (test code = -3)                                     

   

 

             Triglyceride [Mass/volume] in Serum 72 mg/dL     0-149             

        



             or Plasma (test code = 2571-8)                                     

   

 

             Cholesterol in HDL [Mass/volume] in 72 mg/dL     >39               

        



             Serum or Plasma (test code =                                       

 



             -9)                                             

 

             Cholesterol in VLDL [Mass/volume] 13 mg/dL     5-40                

      



             in Serum or Plasma by calculation                                  

      



             (test code = 26720-1)                                        

 

             Cholesterol in LDL [Mass/volume] in 98 mg/dL     0-99              

        



             Serum or Plasma by calculation                                     

   



             (test code = 93759-7)                                        

 

             Laboratory comment [Text] in Report np                             

        



             Narrative (test code = 22008-7)                                    

    



[facility] 
[id number] 00:00:00





             Test Item    Value        Reference Range Interpretation Comments

 

             Hemoglobin A1c/Hemoglobin.total in 5.3 %        4.8-5.6            

       



             Blood (test code = 4548-4)                                        



[facility]-Hydro[facility] D3+25-
Hydroxyvitamin D2 [Mass/volume] in Serum or [id number] 00:00:00





             Test Item    Value        Reference Range Interpretation Comments

 

             25-Hydroxyvitamin 35.4 NG/mL   30.0-100.0                



             D3+25-Hydroxyvitamin D2                                        



             [Mass/volume] in Serum or Plasma                                   

     



             (test code = 33701-0)                                        



[facility]Units/volume] in Serum 
or Plasma by Detection limit &lt;= 0.005 mIU/-84-13 00:00:00





             Test Item    Value        Reference Range Interpretation Comments

 

             Thyrotropin [Units/volume] in 0.446 uIU/mL 0.450-4.500  L          

  



             Serum or Plasma by Detection                                       

 



             limit <= 0.005 mIU/L (test code                                    

    



             = 04411-3)                                          

 

             Thyroxine (T4) free 1.06 NG/dL   0.82-1.77                 



             [Mass/volume] in Serum or Plasma                                   

     



             (test code = 3024-7)                                        



[facility], 
[id number] 00:00:00





             Test Item    Value        Reference Range Interpretation Comments

 

             Interpretation and review of laboratory note                       

            



             results (test code = 61247-0)                                      

  

 

             Report (test code = 79627-8) .                                     

 



[facility]
2022 00:00:00





             Test Item    Value        Reference Range Interpretation Comments

 

             Leukocytes [#/volume] in Blood 4.3 x10e3/uL 3.4-10.8               

   



             by Automated count (test code =                                    

    



             6690-2)                                             

 

             Erythrocytes [#/volume] in 4.86 x10e6/uL 3.77-5.28                 



             Blood by Automated count (test                                     

   



             code = 789-8)                                        

 

             Hemoglobin [Mass/volume] in 13.4 g/dL    11.1-15.9                 



             Blood (test code = 718-7)                                        

 

             Hematocrit [Volume Fraction] of 42.5 %       34.0-46.6             

    



             Blood by Automated count (test                                     

   



             code = 4544-3)                                        

 

             Erythrocyte mean corpuscular 87 fL        79-97                    

 



             volume [Entitic volume] by                                        



             Automated count (test code =                                       

 



             787-2)                                              

 

             Erythrocyte mean corpuscular 27.6 pg      26.6-33.0                

 



             hemoglobin [Entitic mass] by                                       

 



             Automated count (test code =                                       

 



             785-6)                                              

 

             Erythrocyte mean corpuscular 31.5 g/dL    31.5-35.7                

 



             hemoglobin concentration                                        



             [Mass/volume] by Automated                                        



             count (test code = 786-4)                                        

 

             Erythrocyte distribution width 13.5 %       11.7-15.4              

   



             [Ratio] by Automated count                                        



             (test code = 788-0)                                        

 

             Platelets [#/volume] in Blood 211 x10e3/uL 150-450                 

  



             by Automated count (test code =                                    

    



             777-3)                                              

 

             Neutrophils/100 leukocytes in 44 %         not estab.              

  



             Blood by Automated count (test                                     

   



             code = 770-8)                                        

 

             Lymphocytes/100 leukocytes in 39 %         not estab.              

  



             Blood by Automated count (test                                     

   



             code = 736-9)                                        

 

             Monocytes/100 leukocytes in 8 %          not estab.                



             Blood by Automated count (test                                     

   



             code = 5905-5)                                        

 

             Eosinophils/100 leukocytes in 8 %          not estab.              

  



             Blood by Automated count (test                                     

   



             code = 713-8)                                        

 

             Basophils/100 leukocytes in 1 %          not estab.                



             Blood by Automated count (test                                     

   



             code = 706-2)                                        

 

             immature cells (test code = np                                     



             immature cells)                                        

 

             Neutrophils [#/volume] in Blood 1.9 x10e3/uL 1.4-7.0               

    



             by Automated count (test code =                                    

    



             751-8)                                              

 

             Lymphocytes [#/volume] in Blood 1.7 x10e3/uL 0.7-3.1               

    



             by Automated count (test code =                                    

    



             731-0)                                              

 

             Monocytes [#/volume] in Blood 0.4 x10e3/uL 0.1-0.9                 

  



             by Automated count (test code =                                    

    



             742-7)                                              

 

             Eosinophils [#/volume] in Blood 0.3 x10e3/uL 0.0-0.4               

    



             by Automated count (test code =                                    

    



             711-2)                                              

 

             Basophils [#/volume] in Blood 0.0 x10e3/uL 0.0-0.2                 

  



             by Automated count (test code =                                    

    



             704-7)                                              

 

             Immature granulocytes/100 0 %          not estab.                



             leukocytes in Blood by                                        



             Automated count (test code =                                       

 



             78612-5)                                            

 

             Immature granulocytes 0.0 x10e3/uL 0.0-0.1                   



             [#/volume] in Blood by                                        



             Automated count (test code =                                       

 



             83336-6)                                            

 

             Nucleated erythrocytes/100 np                                     



             leukocytes [Ratio] in Blood by                                     

   



             Automated count (test code =                                       

 



             85733-7)                                            

 

             Morphology [Interpretation] in np                                  

   



             Blood Narrative (test code =                                       

 



             75891-5)                                            



[facility] 
Serum or [id number] 00:00:00





             Test Item    Value        Reference Range Interpretation Comments

 

             Glucose [Mass/volume] in 70 mg/dL     65-99                     



             Serum or Plasma (test code =                                       

 



             2345-7)                                             

 

             Urea nitrogen [Mass/volume] 12 mg/dL     6-20                      



             in Serum or Plasma (test code                                      

  



             = 3094-0)                                           

 

             Creatinine [Mass/volume] in 0.75 mg/dL   0.57-1.00                 



             Serum or Plasma (test code =                                       

 



             2160-0)                                             

 

             eGFR (test code = eGFR) 115 mL/min/1.73 >59                       

 

             Urea nitrogen/Creatinine 16           9-23                      



             [Mass Ratio] in Serum or                                        



             Plasma (test code = 3097-3)                                        

 

             Sodium [Moles/volume] in 138 mmol/L   134-144                   



             Serum or Plasma (test code =                                       

 



             2951-2)                                             

 

             Potassium [Moles/volume] in 4.2 mmol/L   3.5-5.2                   



             Serum or Plasma (test code =                                       

 



             2823-3)                                             

 

             Chloride [Moles/volume] in 101 mmol/L                       



             Serum or Plasma (test code =                                       

 



             2075-0)                                             

 

             Carbon dioxide, total 23 mmol/L    20-29                     



             [Moles/volume] in Serum or                                        



             Plasma (test code = 8-9)                                        

 

             Calcium [Mass/volume] in 9.5 mg/dL    8.7-10.2                  



             Serum or Plasma (test code =                                       

 



             71003-6)                                            

 

             Protein [Mass/volume] in 7.4 g/dL     6.0-8.5                   



             Serum or Plasma (test code =                                       

 



             2885-2)                                             

 

             Albumin [Mass/volume] in 4.7 g/dL     3.9-5.0                   



             Serum or Plasma (test code =                                       

 



             1751-7)                                             

 

             Globulin [Mass/volume] in 2.7 g/dL     1.5-4.5                   



             Serum by calculation (test                                        



             code = 59721-0)                                        

 

             Albumin/Globulin [Mass Ratio] 1.7          1.2-2.2                 

  



             in Serum or Plasma (test code                                      

  



             = 1759-0)                                           

 

             Bilirubin.total [Mass/volume] 1.5 mg/dL    0.0-1.2      H          

  



             in Serum or Plasma (test code                                      

  



             = 1975-2)                                           

 

             Alkaline phosphatase 51 IU/L                          



             [Enzymatic activity/volume]                                        



             in Serum or Plasma (test code                                      

  



             = 6768-6)                                           

 

             Aspartate aminotransferase 11 IU/L      0-40                      



             [Enzymatic activity/volume]                                        



             in Serum or Plasma (test code                                      

  



             = 1920-8)                                           

 

             Alanine aminotransferase 8 IU/L       0-32                      



             [Enzymatic activity/volume]                                        



             in Serum or Plasma (test code                                      

  



             = 1742-6)                                           



[facility] Plasma
2022 00:00:00





             Test Item    Value        Reference Range Interpretation Comments

 

             Cholesterol [Mass/volume] in Serum 183 mg/dL    100-199            

       



             or Plasma (test code = 2093-3)                                     

   

 

             Triglyceride [Mass/volume] in Serum 72 mg/dL     0-149             

        



             or Plasma (test code = 2571-8)                                     

   

 

             Cholesterol in HDL [Mass/volume] in 72 mg/dL     >39               

        



             Serum or Plasma (test code =                                       

 



             2085-9)                                             

 

             Cholesterol in VLDL [Mass/volume] 13 mg/dL     5-40                

      



             in Serum or Plasma by calculation                                  

      



             (test code = 48602-1)                                        

 

             Cholesterol in LDL [Mass/volume] in 98 mg/dL     0-99              

        



             Serum or Plasma by calculation                                     

   



             (test code = 51331-5)                                        

 

             Laboratory comment [Text] in Report np                             

        



             Narrative (test code = 08087-0)                                    

    



[facility] 
[id number] 00:00:00





             Test Item    Value        Reference Range Interpretation Comments

 

             Hemoglobin A1c/Hemoglobin.total in 5.3 %        4.8-5.6            

       



             Blood (test code = 4548-4)                                        



[facility]25-Hydroxyvitamin D3+25-
Hydroxyvitamin D2 [Mass/volume] in Serum or [id number] 00:00:00





             Test Item    Value        Reference Range Interpretation Comments

 

             25-Hydroxyvitamin 35.4 NG/mL   30.0-100.0                



             D3+25-Hydroxyvitamin D2                                        



             [Mass/volume] in Serum or Plasma                                   

     



             (test code = 71279-4)                                        



[facility]Units/volume] in Serum 
or Plasma by Detection limit &lt;= 0.005 mIU/-36-22 00:00:00





             Test Item    Value        Reference Range Interpretation Comments

 

             Thyrotropin [Units/volume] in 0.446 uIU/mL 0.450-4.500  L          

  



             Serum or Plasma by Detection                                       

 



             limit <= 0.005 mIU/L (test code                                    

    



             = 80394-7)                                          

 

             Thyroxine (T4) free 1.06 NG/dL   0.82-1.77                 



             [Mass/volume] in Serum or Plasma                                   

     



             (test code = 3024-7)                                        



[facility], 
[id number] 00:00:00





             Test Item    Value        Reference Range Interpretation Comments

 

             Interpretation and review of laboratory note                       

            



             results (test code = 31550-1)                                      

  

 

             Report (test code = 82184-2) .                                     

 



[facility] Auto Differential panel - Blood
2022 00:00:00





             Test Item    Value        Reference Range Interpretation Comments

 

             Leukocytes [#/volume] in Blood 4.3 x10e3/uL 3.4-10.8               

   



             by Automated count (test code =                                    

    



             6690-2)                                             

 

             Erythrocytes [#/volume] in 4.86 x10e6/uL 3.77-5.28                 



             Blood by Automated count (test                                     

   



             code = 789-8)                                        

 

             Hemoglobin [Mass/volume] in 13.4 g/dL    11.1-15.9                 



             Blood (test code = 718-7)                                        

 

             Hematocrit [Volume Fraction] of 42.5 %       34.0-46.6             

    



             Blood by Automated count (test                                     

   



             code = 4544-3)                                        

 

             Erythrocyte mean corpuscular 87 fL        79-97                    

 



             volume [Entitic volume] by                                        



             Automated count (test code =                                       

 



             787-2)                                              

 

             Erythrocyte mean corpuscular 27.6 pg      26.6-33.0                

 



             hemoglobin [Entitic mass] by                                       

 



             Automated count (test code =                                       

 



             785-6)                                              

 

             Erythrocyte mean corpuscular 31.5 g/dL    31.5-35.7                

 



             hemoglobin concentration                                        



             [Mass/volume] by Automated                                        



             count (test code = 786-4)                                        

 

             Erythrocyte distribution width 13.5 %       11.7-15.4              

   



             [Ratio] by Automated count                                        



             (test code = 788-0)                                        

 

             Platelets [#/volume] in Blood 211 x10e3/uL 150-450                 

  



             by Automated count (test code =                                    

    



             777-3)                                              

 

             Neutrophils/100 leukocytes in 44 %         not estab.              

  



             Blood by Automated count (test                                     

   



             code = 770-8)                                        

 

             Lymphocytes/100 leukocytes in 39 %         not estab.              

  



             Blood by Automated count (test                                     

   



             code = 736-9)                                        

 

             Monocytes/100 leukocytes in 8 %          not estab.                



             Blood by Automated count (test                                     

   



             code = 5905-5)                                        

 

             Eosinophils/100 leukocytes in 8 %          not estab.              

  



             Blood by Automated count (test                                     

   



             code = 713-8)                                        

 

             Basophils/100 leukocytes in 1 %          not estab.                



             Blood by Automated count (test                                     

   



             code = 706-2)                                        

 

             immature cells (test code = np                                     



             immature cells)                                        

 

             Neutrophils [#/volume] in Blood 1.9 x10e3/uL 1.4-7.0               

    



             by Automated count (test code =                                    

    



             751-8)                                              

 

             Lymphocytes [#/volume] in Blood 1.7 x10e3/uL 0.7-3.1               

    



             by Automated count (test code =                                    

    



             731-0)                                              

 

             Monocytes [#/volume] in Blood 0.4 x10e3/uL 0.1-0.9                 

  



             by Automated count (test code =                                    

    



             742-7)                                              

 

             Eosinophils [#/volume] in Blood 0.3 x10e3/uL 0.0-0.4               

    



             by Automated count (test code =                                    

    



             711-2)                                              

 

             Basophils [#/volume] in Blood 0.0 x10e3/uL 0.0-0.2                 

  



             by Automated count (test code =                                    

    



             704-7)                                              

 

             Immature granulocytes/100 0 %          not estab.                



             leukocytes in Blood by                                        



             Automated count (test code =                                       

 



             97924-9)                                            

 

             Immature granulocytes 0.0 x10e3/uL 0.0-0.1                   



             [#/volume] in Blood by                                        



             Automated count (test code =                                       

 



             52727-2)                                            

 

             Nucleated erythrocytes/100 np                                     



             leukocytes [Ratio] in Blood by                                     

   



             Automated count (test code =                                       

 



             50829-2)                                            

 

             Morphology [Interpretation] in np                                  

   



             Blood Narrative (test code =                                       

 



             67420-4)                                            



[facility] 
Serum or [id number] 00:00:00





             Test Item    Value        Reference Range Interpretation Comments

 

             Glucose [Mass/volume] in 70 mg/dL     65-99                     



             Serum or Plasma (test code =                                       

 



             2345-7)                                             

 

             Urea nitrogen [Mass/volume] 12 mg/dL     6-20                      



             in Serum or Plasma (test code                                      

  



             = 3094-0)                                           

 

             Creatinine [Mass/volume] in 0.75 mg/dL   0.57-1.00                 



             Serum or Plasma (test code =                                       

 



             2160-0)                                             

 

             eGFR (test code = eGFR) 115 mL/min/1.73 >59                       

 

             Urea nitrogen/Creatinine 16           9-23                      



             [Mass Ratio] in Serum or                                        



             Plasma (test code = 3097-3)                                        

 

             Sodium [Moles/volume] in 138 mmol/L   134-144                   



             Serum or Plasma (test code =                                       

 



             2951-2)                                             

 

             Potassium [Moles/volume] in 4.2 mmol/L   3.5-5.2                   



             Serum or Plasma (test code =                                       

 



             2823-3)                                             

 

             Chloride [Moles/volume] in 101 mmol/L                       



             Serum or Plasma (test code =                                       

 



             5-0)                                             

 

             Carbon dioxide, total 23 mmol/L    20-29                     



             [Moles/volume] in Serum or                                        



             Plasma (test code = -9)                                        

 

             Calcium [Mass/volume] in 9.5 mg/dL    8.7-10.2                  



             Serum or Plasma (test code =                                       

 



             64974-6)                                            

 

             Protein [Mass/volume] in 7.4 g/dL     6.0-8.5                   



             Serum or Plasma (test code =                                       

 



             2885-2)                                             

 

             Albumin [Mass/volume] in 4.7 g/dL     3.9-5.0                   



             Serum or Plasma (test code =                                       

 



             1751-7)                                             

 

             Globulin [Mass/volume] in 2.7 g/dL     1.5-4.5                   



             Serum by calculation (test                                        



             code = 47231-2)                                        

 

             Albumin/Globulin [Mass Ratio] 1.7          1.2-2.2                 

  



             in Serum or Plasma (test code                                      

  



             = 1759-0)                                           

 

             Bilirubin.total [Mass/volume] 1.5 mg/dL    0.0-1.2      H          

  



             in Serum or Plasma (test code                                      

  



             = -)                                           

 

             Alkaline phosphatase 51 IU/L                          



             [Enzymatic activity/volume]                                        



             in Serum or Plasma (test code                                      

  



             = 6768-6)                                           

 

             Aspartate aminotransferase 11 IU/L      0-40                      



             [Enzymatic activity/volume]                                        



             in Serum or Plasma (test code                                      

  



             = 1920-8)                                           

 

             Alanine aminotransferase 8 IU/L       0-32                      



             [Enzymatic activity/volume]                                        



             in Serum or Plasma (test code                                      

  



             = 1742-6)                                           



[facility] Plasma
2022 00:00:00





             Test Item    Value        Reference Range Interpretation Comments

 

             Cholesterol [Mass/volume] in Serum 183 mg/dL    100-199            

       



             or Plasma (test code = 2093-3)                                     

   

 

             Triglyceride [Mass/volume] in Serum 72 mg/dL     0-149             

        



             or Plasma (test code = 2571-8)                                     

   

 

             Cholesterol in HDL [Mass/volume] in 72 mg/dL     >39               

        



             Serum or Plasma (test code =                                       

 



             2085-9)                                             

 

             Cholesterol in VLDL [Mass/volume] 13 mg/dL     5-40                

      



             in Serum or Plasma by calculation                                  

      



             (test code = 03735-7)                                        

 

             Cholesterol in LDL [Mass/volume] in 98 mg/dL     0-99              

        



             Serum or Plasma by calculation                                     

   



             (test code = 41237-5)                                        

 

             Laboratory comment [Text] in Report np                             

        



             Narrative (test code = 25182-3)                                    

    



[facility]em[facility].total in 
[id number] 00:00:00





             Test Item    Value        Reference Range Interpretation Comments

 

             Hemoglobin A1c/Hemoglobin.total in 5.3 %        4.8-5.6            

       



             Blood (test code = 4548-4)                                        



[facility]25-Hydroxyvitamin D3+25-
Hydroxyvitamin D2 [Mass/volume] in Serum or [id number] 00:00:00





             Test Item    Value        Reference Range Interpretation Comments

 

             25-Hydroxyvitamin 35.4 NG/mL   30.0-100.0                



             D3+25-Hydroxyvitamin D2                                        



             [Mass/volume] in Serum or Plasma                                   

     



             (test code = 20219-8)                                        



[facility] [Units/volume] in Serum 
or Plasma by Detection limit &lt;= 0.005 mIU/-11-78 00:00:00





             Test Item    Value        Reference Range Interpretation Comments

 

             Thyrotropin [Units/volume] in 0.446 uIU/mL 0.450-4.500  L          

  



             Serum or Plasma by Detection                                       

 



             limit <= 0.005 mIU/L (test code                                    

    



             = 54102-8)                                          

 

             Thyroxine (T4) free 1.06 NG/dL   0.82-1.77                 



             [Mass/volume] in Serum or Plasma                                   

     



             (test code = 3024-7)                                        



[facility], 
[id number] 00:00:00





             Test Item    Value        Reference Range Interpretation Comments

 

             Interpretation and review of laboratory note                       

            



             results (test code = 57462-8)                                      

  

 

             Report (test code = 07642-6) .                                     

 



[facility]

## 2022-11-06 NOTE — EDPHYS
Physician Documentation                                                                           

 [facility]                                                                 

Name: Veronika Oliva                                                                                

Age: 22 yrs                                                                                       

Sex: Female                                                                                       

: 2000                                                                                   

MRN: [id number]                                                                                   

Arrival Date: 2022                                                                          

Time: 14:12                                                                                       

Account#: [id number]                                                                            

Bed 24                                                                                            

Private MD:                                                                                       

ED Physician Jourdan Raya                                                                         

HPI:                                                                                              

                                                                                             

16:40 This 22 yrs old Black Female presents to ER via Ambulatory with complaints of Decreased snw 

      Appetite, Dizziness, Fatigue.                                                               

16:40 Family state pt has "mental problems". State she is not eating, drinking, sleeping.     snw 

      Sees psych in [place]. Taking remeron.. Onset: The symptoms/episode began/occurred         

      gradually, 1 year(s) ago, and became worse 3 day(s) ago. Severity of symptoms: At their     

      worst the symptoms were moderate severe in the emergency department the symptoms are        

      unchanged. The patient has experienced similar episodes in the past, chronically. It is     

      unknown whether or not the patient has recently seen a physician.                           

                                                                                                  

OB/GYN:                                                                                           

19:58 LMP N/A - Irregular menses                                                              eh3 

                                                                                                  

Historical:                                                                                       

- Allergies:                                                                                      

14:15 No Known Allergies;                                                                     hb  

- Home Meds:                                                                                      

14:15 None [Active];                                                                          hb  

- PMHx:                                                                                           

14:15 None;                                                                                   hb  

- PSHx:                                                                                           

14:15 None;                                                                                   hb  

                                                                                                  

- Immunization history:: Adult Immunizations up to date.                                          

- Social history:: Smoking status: Patient denies any tobacco usage or history of.                

                                                                                                  

                                                                                                  

ROS:                                                                                              

16:39 Eyes: Negative for injury, pain, redness, and discharge, ENT: Negative for injury,      snw 

      pain, and discharge, Neck: Negative for injury, pain, and swelling, Cardiovascular:         

      Negative for chest pain, palpitations, and edema, Respiratory: Negative for shortness       

      of breath, cough, wheezing, and pleuritic chest pain.                                       

16:39 Back: Negative for injury and pain, : Negative for injury, bleeding, discharge, and       

      swelling, MS/Extremity: Negative for injury and deformity, Skin: Negative for injury,       

      rash, and discoloration, Neuro: Negative for headache, weakness, numbness, tingling,        

      and seizure.                                                                                

16:39 Constitutional: Positive for body aches, malaise, poor PO intake.                           

16:39 Abdomen/GI: Positive for anorexia.                                                          

16:39 Psych: Positive for anxiety, depression, insomnia.                                          

                                                                                                  

Exam:                                                                                             

16:37 Head/Face:  Normocephalic, atraumatic. Eyes:  Pupils equal round and reactive to light, snw 

      extra-ocular motions intact.  Lids and lashes normal.  Conjunctiva and sclera are           

      non-icteric and not injected.  Cornea within normal limits.  Periorbital areas with no      

      swelling, redness, or edema. ENT:  Nares patent. No nasal discharge, no septal              

      abnormalities noted.  Tympanic membranes are normal and external auditory canals are        

      clear.  Oropharynx with no redness, swelling, or masses, exudates, or evidence of           

      obstruction, uvula midline.  Mucous membranes moist. Neck:  Trachea midline, no             

      thyromegaly or masses palpated, and no cervical lymphadenopathy.  Supple, full range of     

      motion without nuchal rigidity, or vertebral point tenderness.  No Meningismus.             

      Chest/axilla:  Normal chest wall appearance and motion.  Nontender with no deformity.       

      No lesions are appreciated.                                                                 

16:37 Respiratory:  Lungs have equal breath sounds bilaterally, clear to auscultation and         

      percussion.  No rales, rhonchi or wheezes noted.  No increased work of breathing, no        

      retractions or nasal flaring. Abdomen/GI:  Soft, non-tender, with normal bowel sounds.      

      No distension or tympany.  No guarding or rebound.  No evidence of tenderness               

      throughout. Back:  No spinal tenderness.  No costovertebral tenderness.  Full range of      

      motion. Skin:  Warm, dry with normal turgor.  Normal color with no rashes, no lesions,      

      and no evidence of cellulitis. MS/ Extremity:  Pulses equal, no cyanosis.                   

      Neurovascular intact.  Full, normal range of motion. Neuro:  Awake and alert, GCS 15,       

      oriented to person, place, time, and situation.  Cranial nerves II-XII grossly intact.      

      Motor strength 5/5 in all extremities.  Sensory grossly intact.  Cerebellar exam            

      normal.  Normal gait.                                                                       

16:37 Constitutional: The patient appears awake, does not communicate. Will not answer            

      questions. Family at bedside, state pt has some mental issues.                              

16:37 Chest/axilla: Inspection: normal, Palpation: is normal.                                     

16:37 Cardiovascular: Rate: tachycardic, Rhythm: regular.                                         

16:37 Psych: Behavior/mood is anxious, inappropriate for age, Affect is flat, Oriented to         

      person, place, time, Patient has no thoughts/intents to harm self or others.                

                                                                                                  

Vital Signs:                                                                                      

14:15  / 96; Pulse 105; Resp 16; Temp 97.8(O); Pulse Ox 100% on R/A; Weight 49.9 kg;    hb  

      Height 5 ft. 4 in. (162.56 cm); Pain 0/10;                                                  

14:45  / 68; Pulse 95; Resp 16; Temp 98.0(O); Pulse Ox 100% on R/A;                     eh3 

15:57  / 82; Pulse 93; Resp 16; Pulse Ox 100% on R/A;                                   eh3 

17:03  / 67; Pulse 86; Resp 16; Pulse Ox 100% on R/A;                                   eh3 

18:00  / 76; Pulse 78; Resp 16; Pulse Ox 100% on R/A;                                   eh3 

19:00  / 82; Pulse 68; Resp 16; Pulse Ox 100% on R/A;                                   eh3 

14:15 Body Mass Index 18.88 (49.90 kg, 162.56 cm)                                             hb  

                                                                                                  

MDM:                                                                                              

15:24 Patient medically screened.                                                             snw 

18:43 Data reviewed: vital signs, nurses notes. Data interpreted: Pulse oximetry: on room air snw 

      is 100 %. Interpretation: normal. Counseling: I had a detailed discussion with the          

      patient and/or guardian regarding: the historical points, exam findings, and any            

      diagnostic results supporting the discharge/admit diagnosis, lab results, radiology         

      results, the need for outpatient follow up, to return to the emergency department if        

      symptoms worsen or persist or if there are any questions or concerns that arise at          

      home. Response to treatment: the patient's symptoms have mildly improved after              

      treatment. Special discussion: Based on the history and exam findings, there is no          

      indication for further emergent testing or inpatient evaluation. I discussed with the       

      patient/guardian the need to see the primary care provider for further evaluation of        

      the symptoms. I discussed with the patient/guardian the need to see the psychiatrist        

      for further evaluation of the symptoms.                                                     

                                                                                                  

                                                                                             

15:34 Order name: TSH; Complete Time: 17:04                                                   snw 

                                                                                             

15:34 Order name: CBC with Diff; Complete Time: 16:56                                         snw 

                                                                                             

15:34 Order name: CMP; Complete Time: 17:04                                                   snw 

                                                                                             

15:34 Order name: Urine Culture                                                               snw 

                                                                                             

15:34 Order name: Urine Microscopic Only; Complete Time: 17:24                                snw 

                                                                                             

15:34 Order name: Flu; Complete Time: 17:24                                                   snw 

                                                                                             

15:34 Order name: Urine Dipstick-Ancillary (obtain specimen); Complete Time: 17:14            snw 

                                                                                             

15:34 Order name: Sed Rate; Complete Time: 16:56                                              snw 

                                                                                             

17:09 Order name: Urine Dipstick-Ancillary; Complete Time: 17:24                              EDMS

                                                                                                  

Administered Medications:                                                                         

17:03 Drug: NS 0.9% 1000 ml Route: IV; Rate: 1 bolus; Site: left antecubital;                 3 

21:00 Follow up: IV Status: Order to discontinue infusion; IV Intake: 500ml                   3 

18:04 Drug: D5-NS 1000 ml Route: IV; Rate: bolus; Site: left antecubital;                     3 

19:50 Follow up: IV Status: Completed infusion; IV Intake: 1000ml                             3 

                                                                                                  

                                                                                                  

Disposition:                                                                                      

17:14 Co-signature as Attending Physician, Jourdan Raya DO. Co-signature as Attending          ms3 

      Physician, Jourdan Raya DO I was immediately available onsite in the emergency              

      department for consultation in the care of the patient.                                     

                                                                                                  

Disposition Summary:                                                                              

22 18:41                                                                                    

Discharge Ordered                                                                                 

      Location: Home                                                                          snw 

      Condition: Stable                                                                       snw 

      Diagnosis                                                                                   

        - Dehydration                                                                         snw 

      Followup:                                                                               snw 

        - With: Emergency Department                                                               

        - When: As needed                                                                          

        - Reason: Worsening of condition                                                           

      Followup:                                                                               snw 

        - With: Private Physician                                                                  

        - When: 1 - 2 days                                                                         

        - Reason: Recheck today's complaints, Continuance of care, Re-evaluation by your           

      physician                                                                                   

      Discharge Instructions:                                                                     

        - Discharge Summary Sheet                                                             snw 

        - Dehydration, Adult                                                                  snw 

        - Rehydration, Adult                                                                  snw 

        - Supporting Someone With Depression                                                  snw 

        - Managing Depression, Adult                                                          snw 

        - Insomnia                                                                            snw 

      Forms:                                                                                      

        - Medication Reconciliation Form                                                      snw 

        - Thank You Letter                                                                    snw 

        - Antibiotic Education                                                                snw 

        - Prescription Opioid Use                                                             snw 

      Prescriptions:                                                                              

        - promethazine 25 mg Oral Tablet                                                           

            - take 1 tablet by ORAL route every 6 hours As needed; 20 tablet; Refills: 0,     snw 

      Product Selection Permitted                                                                 

Signatures:                                                                                       

Dispatcher [employer]                           EDMS                                                 

Cassia Cho FNP-C                   FNP-Csnw                                                  

Kate Verduzco RN RN hb Sims, Marcus, DO DO   ms3                                                  

Kristen Encinas RN                          RN   3                                                  

                                                                                                  

Corrections: (The following items were deleted from the chart)                                    

14:15 14:15 Allergies: No Known Allergies; hb                                                 hb  

                                                                                                  

**************************************************************************************************

## 2022-11-06 NOTE — ER
Nurse's Notes                                                                                     

 [facility]                                                                 

Name: Veronika Oliva                                                                                

Age: 22 yrs                                                                                       

Sex: Female                                                                                       

: 2000                                                                                   

MRN: [id number]                                                                                   

Arrival Date: 2022                                                                          

Time: 14:12                                                                                       

Account#: [id number]                                                                            

Bed 24                                                                                            

Private MD:                                                                                       

Diagnosis: Dehydration                                                                            

                                                                                                  

Presentation:                                                                                     

                                                                                             

14:14 Chief complaint: Fatigue, dizziness, decreased appetite x 3 days. Denies N/V/D/fever.   hb  

      Family reports she appears jaundiced. Coronavirus screen: At this time, the client does     

      not indicate any symptoms associated with coronavirus-19. Ebola Screen: No symptoms or      

      risks identified at this time. Risk Assessment: Do you want to hurt yourself or someone     

      else? Patient reports no desire to harm self or others. Onset of symptoms was 2022.                                                                                   

14:14 Method Of Arrival: Ambulatory                                                             

14:15 Initial Sepsis Screen: Does the patient meet any 2 criteria? No. Patient's initial      hb  

      sepsis screen is negative. Does the patient have a suspected source of infection? No.       

      Patient's initial sepsis screen is negative.                                                

14:15 Acuity: JOHN 3                                                                           hb  

                                                                                                  

OB/GYN:                                                                                           

19:58 LMP N/A - Irregular menses                                                              eh3 

                                                                                                  

Historical:                                                                                       

- Allergies:                                                                                      

14:15 No Known Allergies;                                                                     hb  

- Home Meds:                                                                                      

14:15 None [Active];                                                                          hb  

- PMHx:                                                                                           

14:15 None;                                                                                   hb  

- PSHx:                                                                                           

14:15 None;                                                                                   hb  

                                                                                                  

- Immunization history:: Adult Immunizations up to date.                                          

- Social history:: Smoking status: Patient denies any tobacco usage or history of.                

                                                                                                  

                                                                                                  

Screenin:45 Abuse screen: Denies threats or abuse. Denies injuries from another. Nutritional        eh3 

      screening: No deficits noted. Tuberculosis screening: No symptoms or risk factors           

      identified. Fall Risk None identified.                                                      

                                                                                                  

Assessment:                                                                                       

14:54 General: Appears in no apparent distress. comfortable, Behavior is flat, quiet. Pain:   eh3 

      Denies pain. Neuro: Level of Consciousness is awake, alert, obeys commands, Oriented to     

      person, place, time, situation. Cardiovascular: Capillary refill < 3 seconds Patient's      

      skin is warm and dry. Respiratory: Airway is patent Respiratory effort is even,             

      unlabored, Respiratory pattern is regular, symmetrical. GI: Abdomen is flat,                

      non-distended, Parent/caregiver reports the patient having lack of appetite since           

      Thursday. : No signs and/or symptoms were reported regarding the genitourinary            

      system. EENT: No signs and/or symptoms were reported regarding the EENT system. Derm:       

      No signs and/or symptoms reported regarding the dermatologic system. Musculoskeletal:       

      No signs and/or symptoms reported regarding the musculoskeletal system.                     

15:57 Reassessment: Patient and/or family updated on plan of care and expected duration. Pain eh3 

      level reassessed. Patient is alert, oriented x 3, equal unlabored respirations, skin        

      warm/dry/pink.                                                                              

17:03 Reassessment: Patient and/or family updated on plan of care and expected duration. Pain eh3 

      level reassessed. Patient is alert, oriented x 3, equal unlabored respirations, skin        

      warm/dry/pink.                                                                              

18:00 Reassessment: Patient and/or family updated on plan of care and expected duration. Pain eh3 

      level reassessed. Patient is alert, oriented x 3, equal unlabored respirations, skin        

      warm/dry/pink.                                                                              

19:00 Reassessment: Patient and/or family updated on plan of care and expected duration. Pain eh3 

      level reassessed. Patient is alert, oriented x 3, equal unlabored respirations, skin        

      warm/dry/pink. Provider ordered D5 NS 1000 mL bolus to be completed before discharge.       

      300 mL remains to be infused at this time.                                                  

                                                                                                  

Vital Signs:                                                                                      

14:15  / 96; Pulse 105; Resp 16; Temp 97.8(O); Pulse Ox 100% on R/A; Weight 49.9 kg;    hb  

      Height 5 ft. 4 in. (162.56 cm); Pain 0/10;                                                  

14:45  / 68; Pulse 95; Resp 16; Temp 98.0(O); Pulse Ox 100% on R/A;                     eh3 

15:57  / 82; Pulse 93; Resp 16; Pulse Ox 100% on R/A;                                   eh3 

17:03  / 67; Pulse 86; Resp 16; Pulse Ox 100% on R/A;                                   eh3 

18:00  / 76; Pulse 78; Resp 16; Pulse Ox 100% on R/A;                                   eh3 

19:00  / 82; Pulse 68; Resp 16; Pulse Ox 100% on R/A;                                   eh3 

14:15 Body Mass Index 18.88 (49.90 kg, 162.56 cm)                                             hb  

                                                                                                  

ED Course:                                                                                        

14:12 Patient arrived in ED.                                                                  mr  

14:15 Arm band placed on.                                                                     hb  

14:16 [facility]                                                          snw 

14:16 Jourdan Raya DO is Attending Physician.                                                snw 

14:16 Triage completed.                                                                       hb  

14:45 Patient has correct armband on for positive identification. Bed in low position. Call   eh3 

      light in reach. Side rails up X2. Adult w/ patient. Pulse ox on. NIBP on. Door closed.      

      Noise minimized. Warm blanket given.                                                        

15:53 Kristen Encinas, RN is Primary Nurse.                                                        eh3 

16:00 Inserted saline lock: 20 gauge in left antecubital area, using aseptic technique. Blood eh3 

      collected.                                                                                  

16:31 CMP Sent.                                                                               eh3 

16:31 CBC with Diff Sent.                                                                     eh3 

16:31 TSH Sent.                                                                               eh3 

16:31 Sed Rate Sent.                                                                          eh3 

16:31 Flu Sent.                                                                               eh3 

17:14 Urine Culture Sent.                                                                     eh3 

17:14 Urine Microscopic Only Sent.                                                            eh3 

19:58 No provider procedures requiring assistance completed. IV discontinued, intact,         eh3 

      bleeding controlled, No redness/swelling at site. Pressure dressing applied.                

                                                                                                  

Administered Medications:                                                                         

17:03 Drug: NS 0.9% 1000 ml Route: IV; Rate: 1 bolus; Site: left antecubital;                 eh3 

21:00 Follow up: IV Status: Order to discontinue infusion; IV Intake: 500ml                   eh3 

18:04 Drug: D5-NS 1000 ml Route: IV; Rate: bolus; Site: left antecubital;                     eh3 

19:50 Follow up: IV Status: Completed infusion; IV Intake: 1000ml                             3 

                                                                                                  

                                                                                                  

Medication:                                                                                       

19:58 VIS not applicable for this client.                                                     eh3 

                                                                                                  

Intake:                                                                                           

19:50 IV: 1000ml; Total: 1000ml.                                                              eh3 

21:00 IV: 500ml; Total: 1500ml.                                                               eh3 

                                                                                                  

Outcome:                                                                                          

18:41 Discharge ordered by MD.                                                                snw 

19:58 Discharged to                                                                           [facility] 

19:58 Condition: stable                                                                           

19:58 Discharge instructions given to patient, family, Instructed on discharge instructions,      

      follow up and referral plans. medication usage, Demonstrated understanding of               

      instructions, follow-up care, medications, Prescriptions given X 1.                         

19:59 Patient left the ED.                                                                    eh3 

                                                                                                  

Signatures:                                                                                       

Cassia Cho FNP-C                   FNP-Csnw                                                  

GayleEmily                                                                                    

Kate Verduzco, RN                     RN                                                      

Kristen Encinas RN                          RN   eh3                                                  

                                                                                                  

Corrections: (The following items were deleted from the chart)                                    

14:15 14:15 Allergies: No Known Allergies; hb                                                 hb  

14:17 14:14 Chief complaint: Fatigue, dizziness, decreased appetite x 3 days. Denies          hb  

      N/V/D/fever. hb                                                                             

14:17 14:17 General: Behavior is hb                                                           hb  

17:34 17:14 Reassessment: CONTACT: daughter Charity [phone] eh3                        eh3 

21:00 19:00 Reassessment: Patient and/or family updated on plan of care and expected          eh3 

      duration. Pain level reassessed. Patient is alert, oriented x 3, equal unlabored            

      respirations, skin warm/dry/pink. eh3                                                       

                                                                                                  

**************************************************************************************************

## 2023-09-22 ENCOUNTER — HOSPITAL ENCOUNTER (EMERGENCY)
Dept: HOSPITAL 97 - ER | Age: 23
LOS: 1 days | Discharge: HOME | End: 2023-09-23
Payer: COMMERCIAL

## 2023-09-22 DIAGNOSIS — R55: Primary | ICD-10-CM

## 2023-09-22 DIAGNOSIS — R00.2: ICD-10-CM

## 2023-09-22 PROCEDURE — 99284 EMERGENCY DEPT VISIT MOD MDM: CPT

## 2023-09-22 PROCEDURE — 81015 MICROSCOPIC EXAM OF URINE: CPT

## 2023-09-22 PROCEDURE — 93005 ELECTROCARDIOGRAM TRACING: CPT

## 2023-09-22 PROCEDURE — 80307 DRUG TEST PRSMV CHEM ANLYZR: CPT

## 2023-09-22 PROCEDURE — 71045 X-RAY EXAM CHEST 1 VIEW: CPT

## 2023-09-22 PROCEDURE — 81025 URINE PREGNANCY TEST: CPT

## 2023-09-22 PROCEDURE — 87086 URINE CULTURE/COLONY COUNT: CPT

## 2023-09-22 PROCEDURE — 87088 URINE BACTERIA CULTURE: CPT

## 2023-09-22 NOTE — XMS REPORT
Continuity of Care Document

                          Created on:2023



Patient:MICHA CASTILLO

Sex:Female

:2000

External Reference #:[id number]





Demographics







                          Address                   [address]



                                                    [place] [address]

 

                          Home Phone                [phone]

 

                          Mobile Phone              [phone]

 

                          Email Address             [email or web address]

 

                          Preferred Language        English

 

                          Marital Status            Unknown

 

                          Faith Affiliation     Unknown

 

                          Race                      Unknown

 

                          Additional Race(s)        Unavailable



                                                    Black or 

 

                          Ethnic Group              Unknown









Author







                          Organization              [facility]

t

 

                          Address                   [address] [place] [address] [address] [place] [id number]

 

                          Phone                     [phone]









Support







                Name            Relationship    Address         Phone

 

                FREDY MELO     Grandparent     72        [phone]



                                                [place] [address] 

 

                BRENNAN CHEUNG    Other           Unavailable     [phone]









Care Team Providers







                    Name                Role                Phone

 

                    LUIS WHITE   Primary Care Physician Unavailable

 

                    JONE KELLY       Attending Clinician Unavailable

 

                    JAYSHREE ISAACS       Attending Clinician Unavailable

 

                    BIMAL GUZMAN     Attending Clinician Unavailable

 

                    LUIS WHITE   Attending Clinician Unavailable

 

                    Luis White NP Attending Clinician [phone]

 

                    Ngayan_Tho          Attending Clinician Unavailable

 

                    Rehrer Juan Manuel COLLINS Attending Clinician [phone]

 

                    Mason Thomson MD   Attending Clinician [phone]

 

                    DAISY ROGERS     Attending Clinician Unavailable

 

                    Ngayan_Tho          Admitting Clinician Unavailable

 

                    REHRER, DO JUAN MANUEL MILLER Admitting Clinician Unavailable









Payers







           Payer Name Policy Type Policy Number Effective Date Expiration Date [facility]            [id number]    2023 



           ASSISTANCE PROGRAM                       00:00:00   00:00:00   

 

           [facility] COMMUNITY PLAN            [id number]  2023            



           [facility]                              00:00:00              

 

           [facility] [facility]            [id number]    2023 



           PLANNING INDIGENT                       00:00:00   00:00:00   

 

           [facility]            [id number]  2023            



           [place] -                       00:00:00              



           STAR+[id number] (MEDICAID                                             



           REPLACEMENT - HMO)                                             







Problems







       Condition Condition Condition Status Onset  Resolution Last   Treating Co

mments 

Source



       Name   Details Category        Date   Date   Treatment Clinician        



                                                 Date                 

 

       Severe Severe Problem Active 2022                             [facility]



       major  Major                1-09                               da



       depression Depression               00:00:                             Ep

[facility]



       with   with                 00                                 al



       psychotic Psychotic                                                  [facility]

th



       features Features                                                  [facility]

c



                                                                      h



                                                                      Program

 

       Eczema Eczema Problem Active                              [facility]



                                   -15                               da



                                   00:00:                             [facility]



                                   00                                 al



                                                                      Health



                                                                      [facility]



                                                                      h



                                                                      Program







Allergies, Adverse Reactions, Alerts







       Allergy Allergy Status Severity Reaction(s) Onset  Inactive Treating Comm

ents 

Source



       Name   Type                        Date   Date   Clinician        

 

       NO KNOWN Drug   Active                                           [facility]



       ALLERGIE Class                                                   ity of



       S                                                              [facility]







Social History







           Social Habit Start Date Stop Date  Quantity   Comments   Source

 

           Sexual orientation                                             [facility]

ist



                                                                  Hospital

 

           Exposure to 2023 2023-05-15 Not sure              [facility]



           SARS-CoV-2 (event) 00:00:00   11:12:00                         

 

           Tobacco use and 2023-05-15 2023-05-15 Smokeless             [facility]



           exposure   00:00:00   00:00:00   tobacco non-user            

 

           Alcohol intake 2023-05-15 2023-05-15 Ex-drinker            [facility]

lt



                      00:00:00   00:00:00   (finding)             

 

           History of Social 2022                       [facility]

st



           function   00:00:00   00:00:00                         Hospital

 

           Sex Assigned At 2000                       [facility]



           Birth      00:00:00   00:00:00                         Hospital









                Smoking Status  Start Date      Stop Date       Source

 

                Tobacco smoking consumption unknown                             

    [facility]

 

                Never smoked tobacco                                 [facility]







Medications







       Ordered Filled Start  Stop   Current Ordering Indication Dosage Frequency

 Signature

                    Comments            Components          Source



     Medication Medication Date Date Medication? Clinician                (SIG) 

          



     Name Name                                                   

 

     mirtazapine mirtazapine 2022 No             45mg      mirtazapin   

        [facility]



     45 mg 45 mg                           e 45 mg           da



     tablet 45 tablet 45 00:00: 00:00                          tablet 45        

   [facility]



     mg by oral mg by oral 00   :00                           mg by oral        

   al



     route. route.                                    route.           Health



                                                                 [facility]



                                                                 h



                                                                 Program

 

     Zyprexa 2.5 Zyprexa 2.5 2022 No             2.5mg BID  Zyprexa     

      [facility]



     mg tablet mg tablet                           2.5 mg           da



     2.5 mg 2.5 mg 00:00: 00:00                          tablet 2.5           Ep

iscop



     twice a day twice a day 00   :00                           mg twice a      

     al



     by oral by oral                                    day by           Health



     route. route.                                    oral           [facility]



                                                  route.           h



                                                                 Program

 

     OLANZapine      2022- No             2.5mg Q.5D Take 1           Met

hodi



     (ZyPREXA)                                tablet           st



     2.5 MG      00:00: 05:59                          (2.5 mg           [facility]



     tablet      00   :00                           total) by           l



                                                  mouth 2           



                                                  (two)           



                                                  times a           



                                                  day for 30           



                                                  days.           

 

     mirtazapine      2022 No             45mg QD   Take 1           Met

hodi



     (REMERON)                                tablet (45           st



     45 MG      00:00: 05:59                          mg total)           Hospit

a



     tablet      00   :00                           by mouth           l



                                                  nightly           



                                                  for 30           



                                                  days.           

 

     Abilify 5 Abilify 5           No             1    Q1D  Abilify 5           

[facility]



     mg tablet mg tablet                                    mg tablet           

da



     Take 1 Take 1                                    Take 1           [facility]



     tablet tablet                                    tablet           al



     every day every day                                    every day           

Health



     by oral by oral                                    by oral           [facility]

c



     route in route in                                    route in           h



     the morning the morning                                    the            P

rogram



     for 30 for 30                                    morning           



     days. days.                                    for 30           



                                                  days.           

 

     fluconazole fluconazole           No                       fluconazol      

     [facility]



     150 mg 150 mg                                    e 150 mg           da



     tablet TAKE tablet TAKE                                    tablet          

 [facility]



     1 TABLET BY 1 TABLET BY                                    TAKE 1          

 al



     MOUTH EVERY MOUTH EVERY                                    TABLET BY       

    Health



     OTHER DAY OTHER DAY                                    MOUTH           Outr

eac



     FOR 4 DAYS FOR 4 DAYS                                    EVERY           h



                                                  OTHER DAY           Program



                                                  FOR 4 DAYS           

 

     hydrocortis hydrocortis           No                       hydrocorti      

     [facility]



     one 1 % one 1 %                                    sone 1 %           da



     topical topical                                    topical           [facility]

p



     cream APPLY cream APPLY                                    cream           

al



     THIN LAYER THIN LAYER                                    APPLY THIN        

   Health



     TOPICALLY TOPICALLY                                    LAYER           Outr

eac



     TO THE TO THE                                    TOPICALLY           h



     AFFECTED AFFECTED                                    TO THE           Progr

am



     AREA TWICE AREA TWICE                                    AFFECTED          

 



     DAILY DAILY                                    AREA TWICE           



                                                  DAILY           

 

     mirtazapine mirtazapine           No                       mirtazapin      

     [facility]



     30 mg 30 mg                                    e 30 mg           da



     tablet TAKE tablet TAKE                                    tablet          

 [facility]



     1 TABLET BY 1 TABLET BY                                    TAKE 1          

 al



     MOUTH EVERY MOUTH EVERY                                    TABLET BY       

    Health



     DAY AT DAY AT                                    MOUTH           [facility]



     BEDTIME BEDTIME                                    EVERY DAY           h



                                                  AT BEDTIME           Program

 

     triamcinolo triamcinolo           No                       triamcinol      

     [facility]



     ne   ne                                      one            da



     acetonide acetonide                                    acetonide           

[facility]



     0.1 % 0.1 %                                    0.1 %           al



     topical topical                                    topical           Health



     ointment ointment                                    ointment           Out

reac



     APPLY A APPLY A                                    APPLY A           h



     THIN LAYER THIN LAYER                                    THIN LAYER        

   Program



     TO THE TO THE                                    TO THE           



     AFFECTED AFFECTED                                    AFFECTED           



     AREA(S) BY AREA(S) BY                                    AREA(S) BY        

   



     TOPICAL TOPICAL                                    TOPICAL           



     ROUTE 2 ROUTE 2                                    ROUTE 2           



     TIMES PER TIMES PER                                    TIMES PER           



     DAY use DAY use                                    DAY use           



     only if only if                                    only if           



     hydrocortis hydrocortis                                    hydrocorti      

     



     one not one not                                    sone not           



     effective effective                                    effective           

 

     hydrocortis hydrocortis           No                       hydrocorti      

     [facility]



     one 1 % one 1 %                                    sone 1 %           da



     topical topical                                    topical           [facility]

p



     cream APPLY cream APPLY                                    cream           

al



     A THIN A THIN                                    APPLY A           Health



     LAYER TO LAYER TO                                    THIN LAYER           O

[facility]



     THE  THE                                     TO THE           h



     AFFECTED AFFECTED                                    AFFECTED           Pro

gram



     AREA(S) BY AREA(S) BY                                    AREA(S) BY        

   



     TOPICAL TOPICAL                                    TOPICAL           



     ROUTE 2 ROUTE 2                                    ROUTE 2           



     TIMES PER TIMES PER                                    TIMES PER           



     DAY  DAY                                     DAY            

 

     triamcinolo triamcinolo           No                       triamcinol      

     [facility]



     ne   ne                                      one            da



     acetonide acetonide                                    acetonide           

[facility]



     0.1 % 0.1 %                                    0.1 %           al



     topical topical                                    topical           Health



     ointment ointment                                    ointment           Out

reac



     APPLY A APPLY A                                    APPLY A           h



     THIN LAYER THIN LAYER                                    THIN LAYER        

   Program



     TO THE TO THE                                    TO THE           



     AFFECTED AFFECTED                                    AFFECTED           



     AREA(S) BY AREA(S) BY                                    AREA(S) BY        

   



     TOPICAL TOPICAL                                    TOPICAL           



     ROUTE 2 ROUTE 2                                    ROUTE 2           



     TIMES PER TIMES PER                                    TIMES PER           



     DAY use DAY use                                    DAY use           



     only if only if                                    only if           



     hydrocortis hydrocortis                                    hydrocorti      

     



     one not one not                                    sone not           



     effective effective                                    effective           

 

     Abilify 5 Abilify 5           No             1    Q1D  Abilify 5           

[facility]



     mg tablet mg tablet                                    mg tablet           

da



     Take 1 Take 1                                    Take 1           [facility]



     tablet tablet                                    tablet           al



     every day every day                                    every day           

Health



     by oral by oral                                    by oral           [facility]

c



     route in route in                                    route in           h



     the morning the morning                                    the            P

rogram



     for 30 for 30                                    morning           



     days. days.                                    for 30           



                                                  days.           

 

     ciprofloxac ciprofloxac           No                       ciprofloxa      

     [facility]



     in 500 mg in 500 mg                                    quincy 500 mg          

 da



     tablet TAKE tablet TAKE                                    tablet          

 [facility]



     1 TABLET BY 1 TABLET BY                                    TAKE 1          

 al



     MOUTH EVERY MOUTH EVERY                                    TABLET BY       

    Health



     24 HOURS 24 HOURS                                    MOUTH           [facility]

c



     FOR 3 DAYS FOR 3 DAYS                                    EVERY 24          

 h



                                                  HOURS FOR           Program



                                                  3 DAYS           

 

     fluconazole fluconazole           No                       fluconazol      

     [facility]



     150 mg 150 mg                                    e 150 mg           da



     tablet TAKE tablet TAKE                                    tablet          

 [facility]



     1 TABLET BY 1 TABLET BY                                    TAKE 1          

 al



     MOUTH EVERY MOUTH EVERY                                    TABLET BY       

    Health



     OTHER DAY OTHER DAY                                    MOUTH           Outr

eac



     FOR 4 DAYS FOR 4 DAYS                                    EVERY           h



                                                  OTHER DAY           Program



                                                  FOR 4 DAYS           

 

     hydrocortis hydrocortis           No                       hydrocorti      

     [facility]



     one 1 % one 1 %                                    sone 1 %           da



     topical topical                                    topical           [facility]

p



     cream APPLY cream APPLY                                    cream           

al



     THIN LAYER THIN LAYER                                    APPLY THIN        

   Health



     TOPICALLY TOPICALLY                                    LAYER           Outr

eac



     TO THE TO THE                                    TOPICALLY           h



     AFFECTED AFFECTED                                    TO THE           Progr

am



     AREA TWICE AREA TWICE                                    AFFECTED          

 



     DAILY DAILY                                    AREA TWICE           



                                                  DAILY           

 

     mirtazapine mirtazapine           No                       mirtazapin      

     [facility]



     30 mg 30 mg                                    e 30 mg           da



     tablet TAKE tablet TAKE                                    tablet          

 [facility]



     1 TABLET BY 1 TABLET BY                                    TAKE 1          

 al



     MOUTH EVERY MOUTH EVERY                                    TABLET BY       

    Health



     DAY AT DAY AT                                    MOUTH           [facility]



     BEDTIME BEDTIME                                    EVERY DAY           h



                                                  AT BEDTIME           Program

 

     promethazin promethazin           No                       promethazi      

     [facility]



     e 25 mg e 25 mg                                    ne 25 mg           da



     tablet TAKE tablet TAKE                                    tablet          

 [facility]



     1 TABLET BY 1 TABLET BY                                    TAKE 1          

 al



     MOUTH EVEYR MOUTH EVEYR                                    TABLET BY       

    Health



     6 HOURS AS 6 HOURS AS                                    MOUTH           Ou

treac



     NEEDED FOR NEEDED FOR                                    EVEYR 6           

h



     NAUSEA NAUSEA                                    HOURS AS           Program



                                                  NEEDED FOR           



                                                  NAUSEA           

 

     triamcinolo triamcinolo           No                       triamcinol      

     [facility]



     ne   ne                                      one            da



     acetonide acetonide                                    acetonide           

[facility]



     0.1 % 0.1 %                                    0.1 %           al



     topical topical                                    topical           Health



     ointment ointment                                    ointment           Out

reac



     APPLY A APPLY A                                    APPLY A           h



     THIN LAYER THIN LAYER                                    THIN LAYER        

   Program



     TO THE TO THE                                    TO THE           



     AFFECTED AFFECTED                                    AFFECTED           



     AREA(S) BY AREA(S) BY                                    AREA(S) BY        

   



     TOPICAL TOPICAL                                    TOPICAL           



     ROUTE 2 ROUTE 2                                    ROUTE 2           



     TIMES PER TIMES PER                                    TIMES PER           



     DAY use DAY use                                    DAY use           



     only if only if                                    only if           



     hydrocortis hydrocortis                                    hydrocorti      

     



     one not one not                                    sone not           



     effective effective                                    effective           

 

     mirtazapine mirtazapine           No             45mg Q1D  mirtazapin      

     [facility]



     45 mg 45 mg                                    e 45 mg           da



     tablet Take tablet Take                                    tablet          

 [facility]



     45 mg every 45 mg every                                    Take 45 mg      

     al



     day by oral day by oral                                    every day       

    Health



     route at route at                                    by oral           [facility]

e[facility]



     bedtime for bedtime for                                    route at        

   h



     30 days. 30 days.                                    bedtime           Pro[facility]

[facility]



                                                  for 30           



                                                  days.           

 

     Zyprexa 10 Zyprexa 10           No             1    Q1D  Zyprexa 10        

   [facility]



     mg tablet mg tablet                                    mg tablet           

da



     Take 1 Take 1                                    Take 1           [facility]



     tablet tablet                                    tablet           al



     every day every day                                    every day           

Health



     by oral by oral                                    by oral           [facility]

c



     route at route at                                    route at           h



     bedtime for bedtime for                                    bedtime         

  Program



     30 days. 30 days.                                    for 30           



                                                  days.           

 

     mirtazapine mirtazapine           No             45mg Q1D  mirtazapin      

     [facility]



     45 mg 45 mg                                    e 45 mg           da



     tablet Take tablet Take                                    tablet          

 [facility]



     45 mg every 45 mg every                                    Take 45 mg      

     al



     day by oral day by oral                                    every day       

    Health



     route at route at                                    by oral           [facility]

e[facility]



     bedtime for bedtime for                                    route at        

   h



     30 days. 30 days.                                    bedtime           Prog

[facility]



                                                  for 30           



                                                  days.           

 

     Zyprexa 10 Zyprexa 10           No             1    Q1D  Zyprexa 10        

   [facility]



     mg tablet mg tablet                                    mg tablet           

da



     Take 1 Take 1                                    Take 1           [facility]



     tablet tablet                                    tablet           al



     every day every day                                    every day           

Health



     by oral by oral                                    by oral           [facility]

c



     route at route at                                    route at           h



     bedtime for bedtime for                                    bedtime         

  Program



     30 days. 30 days.                                    for 30           



                                                  days.           

 

     mirtazapine mirtazapine           No             45mg Q1D  mirtazapin      

     [facility]



     45 mg 45 mg                                    e 45 mg           da



     tablet Take tablet Take                                    tablet          

 [facility]



     45 mg every 45 mg every                                    Take 45 mg      

     al



     day by oral day by oral                                    every day       

    Health



     route at route at                                    by oral           [facility]

e[facility]



     bedtime for bedtime for                                    route at        

   h



     30 days. 30 days.                                    bedtime           Prog

mila



                                                  for 30           



                                                  days.           

 

     Zyprexa 10 Zyprexa 10           No             1    Q1D  Zyprexa 10        

   [facility]



     mg tablet mg tablet                                    mg tablet           

da



     Take 1 Take 1                                    Take 1           [facility]



     tablet tablet                                    tablet           al



     every day every day                                    every day           

Health



     by oral by oral                                    by oral           [facility]

c



     route at route at                                    route at           h



     bedtime for bedtime for                                    bedtime         

  Program



     30 days. 30 days.                                    for 30           



                                                  days.           

 

     Abilify 2 Abilify 2           No             1    Q1D  Abilify 2           

[facility]



     mg tablet mg tablet                                    mg tablet           

da



     Take 1 Take 1                                    Take 1           [facility]



     tablet tablet                                    tablet           al



     every day every day                                    every day           

Health



     by oral by oral                                    by oral           [facility]

c



     route with route with                                    route with        

   h



     meals for meals for                                    meals for           

Program



     30 days. 30 days.                                    30 days.           

 

     cephalexin cephalexin           No                       cephalexin        

   [facility]



     500 mg 500 mg                                    500 mg           da



     capsule capsule                                    capsule           [facility]

p



     TAKE 1 TAKE 1                                    TAKE 1           al



     CAPSULE BY CAPSULE BY                                    CAPSULE BY        

   Health



     MOUTH 4 MOUTH 4                                    MOUTH 4           [facility]

c



     TIMES A DAY TIMES A DAY                                    TIMES A         

  h



     FOR UTI FOR UTI                                    DAY FOR           [facility]

m



                                                  UTI            

 

     hydrocortis hydrocortis           No                       hydrocorti      

     [facility]



     one 1 % one 1 %                                    sone 1 %           da



     topical topical                                    topical           [facility]

p



     cream APPLY cream APPLY                                    cream           

al



     THIN LAYER THIN LAYER                                    APPLY THIN        

   Health



     TOPICALLY TOPICALLY                                    LAYER           Out[facility]

eac



     TO THE TO THE                                    TOPICALLY           h



     AFFECTED AFFECTED                                    TO THE           Pro[facility]

am



     AREA TWICE AREA TWICE                                    AFFECTED          

 



     DAILY DAILY                                    AREA TWICE           



                                                  DAILY           

 

     mirtazapine mirtazapine           No                       mirtazapin      

     [facility]



     45 mg 45 mg                                    e 45 mg           da



     tablet TAKE tablet TAKE                                    tablet          

 [facility]



     1 TABLET BY 1 TABLET BY                                    TAKE 1          

 al



     MOUTH EVERY MOUTH EVERY                                    TABLET BY       

    Health



     DAY AT DAY AT                                    MOUTH           [facility]



     BEDTIME BEDTIME                                    EVERY DAY           h



                                                  AT BEDTIME           Program

 

     sertraline sertraline           No             1    Q1D  sertraline        

   [facility]



     50 mg 50 mg                                    50 mg           da



     tablet Take tablet Take                                    tablet          

 [facility]



     1 tablet 1 tablet                                    Take 1           al



     every day every day                                    tablet           [facility]

lth



     by oral by oral                                    every day           Out[facility]

eac



     route with route with                                    by oral           

h



     meals for meals for                                    route with          

 Program



     30 days. 30 days.                                    meals for           



                                                  30 days.           

 

     Zyprexa 10 Zyprexa 10           No             1    Q1D  Zyprexa 10        

   [facility]



     mg tablet mg tablet                                    mg tablet           

da



     Take 1 Take 1                                    Take 1           [facility]



     tablet tablet                                    tablet           al



     every day every day                                    every day           

Health



     by oral by oral                                    by oral           [facility]

c



     route at route at                                    route at           h



     bedtime for bedtime for                                    bedtime         

  Program



     30 days. 30 days.                                    for 30           



                                                  days.           

 

     hydrocortis hydrocortis           No                       hydrocorti      

     [facility]



     one 1 % one 1 %                                    sone 1 %           da



     topical topical                                    topical           [facility]

p



     cream APPLY cream APPLY                                    cream           

al



     THIN LAYER THIN LAYER                                    APPLY THIN        

   Health



     TOPICALLY TOPICALLY                                    LAYER           Out[facility]

eac



     TO THE TO THE                                    TOPICALLY           h



     AFFECTED AFFECTED                                    TO THE           Progr

am



     AREA TWICE AREA TWICE                                    AFFECTED          

 



     DAILY DAILY                                    AREA TWICE           



                                                  DAILY           

 

     mirtazapine mirtazapine           No             1    Q1D  mirtazapin      

     [facility]



     30 mg 30 mg                                    e 30 mg           da



     tablet Take tablet Take                                    tablet          

 [facility]



     1 tablet 1 tablet                                    Take 1           al



     every day every day                                    tablet           [facility]

l[facility]



     by oral by oral                                    every day           Out[facility]

e[facility]



     route at route at                                    by oral           h



     bedtime for bedtime for                                    route at        

   Program



     30 days. 30 days.                                    bedtime           



                                                  for 30           



                                                  days.           

 

     triamcinolo triamcinolo           No                       triamcinol      

     [facility]



     ne   ne                                      one            da



     acetonide acetonide                                    acetonide           

[facility]



     0.1 % 0.1 %                                    0.1 %           al



     topical topical                                    topical           Health



     ointment ointment                                    ointment           Out

[facility]



     APPLY A APPLY A                                    APPLY A           h



     THIN LAYER THIN LAYER                                    THIN LAYER        

   Program



     TO THE TO THE                                    TO THE           



     AFFECTED AFFECTED                                    AFFECTED           



     AREA(S) BY AREA(S) BY                                    AREA(S) BY        

   



     TOPICAL TOPICAL                                    TOPICAL           



     ROUTE 2 ROUTE 2                                    ROUTE 2           



     TIMES PER TIMES PER                                    TIMES PER           



     DAY use DAY use                                    DAY use           



     only if only if                                    only if           



     hydrocortis hydrocortis                                    hydrocorti      

     



     one not one not                                    sone not           



     effective effective                                    effective           

 

     hydrocortis hydrocortis           No                       hydrocorti      

     [facility]



     one 1 % one 1 %                                    sone 1 %           da



     topical topical                                    topical           [facility]

p



     cream APPLY cream APPLY                                    cream           

al



     THIN LAYER THIN LAYER                                    APPLY THIN        

   Health



     TOPICALLY TOPICALLY                                    LAYER           Out[facility]

eac



     TO THE TO THE                                    TOPICALLY           h



     AFFECTED AFFECTED                                    TO THE           Pro[facility]

am



     AREA TWICE AREA TWICE                                    AFFECTED          

 



     DAILY DAILY                                    AREA TWICE           



                                                  DAILY           

 

     mirtazapine mirtazapine           No                       mirtazapin      

     [facility]



     30 mg 30 mg                                    e 30 mg           da



     tablet TAKE tablet TAKE                                    tablet          

 [facility]



     1 TABLET BY 1 TABLET BY                                    TAKE 1          

 al



     MOUTH EVERY MOUTH EVERY                                    TABLET BY       

    Health



     DAY AT DAY AT                                    MOUTH           [facility]



     BEDTIME BEDTIME                                    EVERY DAY           h



                                                  AT BEDTIME           Program

 

     triamcinolo triamcinolo           No                       triamcinol      

     [facility]



     ne   ne                                      one            da



     acetonide acetonide                                    acetonide           

[facility]



     0.1 % 0.1 %                                    0.1 %           al



     topical topical                                    topical           Health



     ointment ointment                                    ointment           Out

reac



     APPLY A APPLY A                                    APPLY A           h



     THIN LAYER THIN LAYER                                    THIN LAYER        

   Program



     TO THE TO THE                                    TO THE           



     AFFECTED AFFECTED                                    AFFECTED           



     AREA(S) BY AREA(S) BY                                    AREA(S) BY        

   



     TOPICAL TOPICAL                                    TOPICAL           



     ROUTE 2 ROUTE 2                                    ROUTE 2           



     TIMES PER TIMES PER                                    TIMES PER           



     DAY use DAY use                                    DAY use           



     only if only if                                    only if           



     hydrocortis hydrocortis                                    hydrocorti      

     



     one not one not                                    sone not           



     effective effective                                    effective           

 

     Diflucan Diflucan           No             1    Q2D  Diflucan           [facility]

[facility]



     150 mg 150 mg                                    150 mg           da



     tablet Take tablet Take                                    tablet          

 [facility]



     1 tablet 1 tablet                                    Take 1           al



     every other every other                                    tablet          

 [facility]



     day by oral day by oral                                    every           

[facility]



     route for 4 route for 4                                    other day       

    h



     days. days.                                    by oral           Program



                                                  route for           



                                                  4 days.           

 

     hydrocortis hydrocortis           No                       hydrocorti      

     [facility]



     one 1 % one 1 %                                    sone 1 %           da



     topical topical                                    topical           [facility]

p



     cream APPLY cream APPLY                                    cream           

al



     THIN LAYER THIN LAYER                                    APPLY THIN        

   Health



     TOPICALLY TOPICALLY                                    LAYER           Out[facility]

eac



     TO THE TO THE                                    TOPICALLY           h



     AFFECTED AFFECTED                                    TO THE           Progr

am



     AREA TWICE AREA TWICE                                    AFFECTED          

 



     DAILY DAILY                                    AREA TWICE           



                                                  DAILY           

 

     mirtazapine mirtazapine           No                       mirtazapin      

     [facility]



     30 mg 30 mg                                    e 30 mg           da



     tablet TAKE tablet TAKE                                    tablet          

 [facility]



     1 TABLET BY 1 TABLET BY                                    TAKE 1          

 al



     MOUTH EVERY MOUTH EVERY                                    TABLET BY       

    Health



     DAY AT DAY AT                                    MOUTH           [facility]



     BEDTIME BEDTIME                                    EVERY DAY           h



                                                  AT BEDTIME           Program

 

     triamcinolo triamcinolo           No                       triamcinol      

     [facility]



     ne   ne                                      one            da



     acetonide acetonide                                    acetonide           

[facility]



     0.1 % 0.1 %                                    0.1 %           al



     topical topical                                    topical           Health



     ointment ointment                                    ointment           Out

reac



     APPLY A APPLY A                                    APPLY A           h



     THIN LAYER THIN LAYER                                    THIN LAYER        

   Program



     TO THE TO THE                                    TO THE           



     AFFECTED AFFECTED                                    AFFECTED           



     AREA(S) BY AREA(S) BY                                    AREA(S) BY        

   



     TOPICAL TOPICAL                                    TOPICAL           



     ROUTE 2 ROUTE 2                                    ROUTE 2           



     TIMES PER TIMES PER                                    TIMES PER           



     DAY use DAY use                                    DAY use           



     only if only if                                    only if           



     hydrocortis hydrocortis                                    hydrocorti      

     



     one not one not                                    sone not           



     effective effective                                    effective           







Vital Signs







             Vital Name   Observation Time Observation Value Comments     Source

 

             Systolic blood 2023-05-15 11:26:00 106 mm[Hg]                [facility]



             pressure                                            

 

             Diastolic blood 2023-05-15 11:26:00 73 mm[Hg]                 [facility]



             pressure                                            

 

             Heart rate   2023-05-15 11:26:00 64 /min                   [facility]

 

             Body temperature 2023-05-15 11:26:00 36.78 Maryse                 [facility]

is [facility]

 

             Respiratory rate 2023-05-15 11:26:00 18 /min                   [facility]

is [facility]

 

             Body height  2023-05-15 11:26:00 160 cm                    [facility]

 

             Body weight  2023-05-15 11:26:00 72.485 kg                 [facility]

 

             BMI          2023-05-15 11:26:00 28.31 kg/m2               [facility]

 

             Systolic blood 2023-05-15 11:26:00 106 mm[Hg]                [facility]



             pressure                                            

 

             Diastolic blood 2023-05-15 11:26:00 73 mm[Hg]                 [facility]

[facility]



             pressure                                            

 

             Heart rate   2023-05-15 11:26:00 64 /min                   [facility]

[facility]

 

             Body temperature 2023-05-15 11:26:00 36.78 Maryse                 [facility]

[facility]

 

             Respiratory rate 2023-05-15 11:26:00 18 /min                   [facility]

[facility]

 

             Body height  2023-05-15 11:26:00 160 cm                    [facility]

 

             Body weight  2023-05-15 11:26:00 72.485 kg                 [facility]

 

             BMI          2023-05-15 11:26:00 28.31 kg/m2               [facility]

 

             BP Diastolic 2023 00:00:00 71 mm[Hg]                 [facility]

a



                                                                 [facility]

h



                                                                 [facility]

m

 

             Height       2023 00:00:00 65 [in_i]                 [facility]

a



                                                                 [facility]

h



                                                                 [facility]

m

 

             BMI (Body Mass 2023 00:00:00 19.2 kg/m2                [facility]

[facility]



             Index)                                              [facility]

h



                                                                 [facility]

m

 

             BP Systolic  2023 00:00:00 97 mm[Hg]                 [facility]

a



                                                                 [facility]

h



                                                                 [facility]

m

 

             Body Weight  2023 00:00:00 1845 [oz_av]              [facility]

a



                                                                 [facility]

h



                                                                 [facility]

m

 

             BP Diastolic 2022 00:00:00 80 mm[Hg]                 [facility]

a



                                                                 [facility]

h



                                                                 [facility]

m

 

             Height       2022 00:00:00 65 [in_i]                 [facility]

a



                                                                 [facility]

h



                                                                 [facility]

m

 

             BMI (Body Mass 2022 00:00:00 17.9 kg/m2                [facility]

[facility]



             Index)                                              [facility]

h



                                                                 [facility]

m

 

             BP Systolic  2022 00:00:00 115 mm[Hg]                [place]

a



                                                                 [facility]

h



                                                                 [facility]

m

 

             Body Weight  2022 00:00:00 1717 [oz_av]              [place]

a



                                                                 [facility]

h



                                                                 [facility]

m

 

             BP Diastolic 2022 00:00:00 77 mm[Hg]                 [place]

a



                                                                 [facility]

h



                                                                 [facility]

m

 

             Height       2022 00:00:00 65 [in_i]                 [place]

a



                                                                 [facility]

h



                                                                 [facility]

m

 

             BMI (Body Mass 2022 00:00:00 18.3 kg/m2                [place]

rda



             Index)                                              [facility]

h



                                                                 [facility]

m

 

             BP Systolic  2022 00:00:00 111 mm[Hg]                [place]

a



                                                                 [facility]

h



                                                                 [facility]

m

 

             Body Weight  2022 00:00:00 1762 [oz_av]              [place]

a



                                                                 [facility]

h



                                                                 [facility]

m

 

             BP Systolic  2022-10-11 00:00:00 102 mm[Hg]                [place]

a



                                                                 [facility]

h



                                                                 [facility]

m

 

             Body Weight  2022-10-11 00:00:00 117 [lb_av]               [place]

a



                                                                 [facility]

h



                                                                 [facility]

m

 

             BP Diastolic 2022-10-11 00:00:00 74 mm[Hg]                 [place]

a



                                                                 [facility]

h



                                                                 [facility]

m

 

             Height       2022-10-11 00:00:00 65 [in_i]                 [place]

a



                                                                 [facility]

h



                                                                 [facility]

m

 

             BMI (Body Mass 2022-10-11 00:00:00 19.5 kg/m2                [place]

rda



             Index)                                              [facility]

h



                                                                 [facility]

m

 

             BP Diastolic 2022-10-05 00:00:00 70 mm[Hg]                 [place]

a



                                                                 [facility]

h



                                                                 [facility]

m

 

             Height       2022-10-05 00:00:00 65 [in_i]                 [place]

a



                                                                 [facility]

h



                                                                 [facility]

m

 

             BMI (Body Mass 2022-10-05 00:00:00 20 kg/m2                  [place]

rda



             Index)                                              [facility]

h



                                                                 [facility]

m

 

             BP Systolic  2022-10-05 00:00:00 101 mm[Hg]                [place]

a



                                                                 [facility]

h



                                                                 [facility]

m

 

             Body Weight  2022-10-05 00:00:00 1920 [oz_av]              [place]

a



                                                                 [facility]

h



                                                                 [facility]

m

 

             BP Diastolic 2022 00:00:00 79 mm[Hg]                 [place]

a



                                                                 [facility]

h



                                                                 [facility]

m

 

             Height       2022 00:00:00 65 [facility]i]                 [facility]

h



                                                                 [facility]

m

 

             BMI (Body Mass 2022 00:00:00 19.1 kg/m2                [facility]

rda



             Index)                                              [facility]

h



                                                                 [facility]

m

 

             BP Systolic  2022 00:00:00 114 mm[Hg]                [facility]

[facility]



                                                                 [facility]

h



                                                                 [facility]

m

 

             Body Weight  2022 00:00:00 1840 [oz_av]              [facility]

[facility]



                                                                 [facility]

h



                                                                 [facility]

m

 

             Systolic blood 2022 16:48:57 111 mm[Hg]                [facility]



             pressure                                            

 

             Diastolic blood 2022 16:48:57 67 mm[Hg]                 [facility]



             pressure                                            

 

             Heart rate   2022 16:48:57 76 /min                   [facility]

 

             Body temperature 2022 16:48:57 36.89 Maryse                 [facility]

 

             Respiratory rate 2022 16:48:57 16 /min                   [facility]

 

             Oxygen saturation in 2022 16:48:57 100 /min                  

[facility]



             Arterial blood by                                        



             Pulse oximetry                                        

 

             Body height  2022 02:45:00 162.6 cm                  [facility]

 

             Body weight  2022 02:45:00 49.896 kg                 [facility]

 

             BMI          2022 02:45:00 18.88 kg/m2               [facility]







Procedures







                Procedure       Date / Time     Performing Clinician Source



                                Performed                       

 

                Syphilis Screen for 2023-05-15 00:00:00                 Lion telles



                Infection                                       

 

                HIV Ag/Ab Combo Routine 2023-05-15 00:00:00                 [facility]

[facility]



                Screening                                       

 

                CBC w/Differential 2023-05-15 00:00:00                 Lion messer

 

                Comprehensive Metabolic 2023-05-15 00:00:00                 [facility]

[facility]



                Panel                                           

 

                TSH [Thyroid Stimulating 2023-05-15 00:00:00                 Aubrey

[facility]



                Hormone]                                        

 

                Lipid Profile   2023-05-15 00:00:00                 Lion barragan

 

                Hemoglobin A1C  2023-05-15 00:00:00                 Lion barragan

 

                URINE CULTURE   2022 06:50:00 Juna Manuel [facility]

 

                URINALYSIS SCREEN AND 2022 06:04:00 Rehrer, [facility]



                MICROSCOPY, WITH REFLEX                                 



                TO CULTURE                                      

 

                URINE DRUGS OF ABUSE 2022 06:04:00 Rehrer, [facility]



                SCREEN                                          

 

                HCG QUALITATIVE, URINE 2022 06:04:00 Rehrer, [facility]



                SCREEN                                          

 

                ECG ED PRELIMINARY 2022 05:03:05 Rehrer, [facility]



                INTERPRETATION                                  

 

                ECG 12-LEAD     2022 03:57:14 Rehrer, [facility]

 

                COVID-19 QUALITATIVE 2022 03:25:00 Rehrer, [facility]



                RT-PCR                                          

 

                CBC WITH PLATELET AND 2022 03:25:00 Rehrer, [facility]



                DIFFERENTIAL                                    

 

                COMPREHENSIVE METABOLIC 2022 03:25:00 Rehrer, [facility]



                PANEL                                           

 

                T4, FREE        2022 03:25:00 Rehrer, [facility]

 

                THYROID STIMULATING 2022 03:25:00 Rehrer, [facility]



                HORMONE                                         

 

                CREATINE KINASE, TOTAL 2022 03:25:00 Rehrer, [facility]



                (CPK)                                           

 

                VENOUS BLOOD GAS 2022 03:25:00 Rehrer, [facility]

 

                ESTIMATED GFR   2022 03:25:00 Rehrer, [facility]

 

                ACETAMINOPHEN LEVEL 2022 03:25:00 Rehrer, [facility]

 

                ALCOHOL LEVEL, BLOOD 2022 03:25:00 Rehrer, [facility]

 

                MAGNESIUM LEVEL 2022 03:25:00 Rehrer, [facility]

 

                PHOSPHORUS LEVEL 2022 03:25:00 Rehrer, [facility]

 

                SALICYLATE LEVEL 2022 03:25:00 Rehrer, [facility]

 

                HCG QUANTITATIVE, SERUM 2022 03:25:00 Rehrer, [facility]

 

                US, pelvis, complete 2022-10-11 00:00:00                 [facility]ings[facility] magdal[facility]



                                                                [facility]



                                                                Program

 

                US, thyroid     2022-10-05 00:00:00                 Debbi Nolan

iscopal



                                                                [facility]



                                                                Program







Plan of Care







             Planned Activity Planned Date Details      Comments     Source

 

             Future Scheduled              Generalized Anxiety              Inocencio bray [facility]



             Test                      [facility]              [facility]

ach



                                       Treatment Plan -Develop              Prog

mila



                                       coping regarding              



                                       person's difficulties              



                                       managing worrying              



                                       -Develop coping skills              



                                       to address restlessness              



                                       -Develop coping skills              



                                       to address fatigue              



                                       -Develop coping skills              



                                       to address difficulties              



                                       with concentration              



                                       -Develop coping skills              



                                       to address irritability              



                                       -Develop coping skills              



                                       to address muscle              



                                       tension -Develop coping              



                                       skills to address sleep              



                                       disturbances -Address              



                                       RAJEEV impact on social              



                                       functioning -Address RAJEEV              



                                       impact on occupational              



                                       functioning -Address RAJEEV              



                                       impact on activities of              



                                       daily living [code =              



                                       Generalized Anxiety              



                                       Disorder Master              



                                       Treatment Plan -Develop              



                                       coping regarding              



                                       person's difficulties m]              







Encounters







        Start   End     Encounter Admission Attending Care    Care    Encounter 

Source



        Date/Time Date/Time Type    Type    Clinicians Facility Department ID   

   

 

        2023         Inpatient                 [place]  [place]  [id number] 

[place]



        23:08:06                                                 [id number]  [place]

 

        2023         Inpatient                 [place]  T[place]ANA  [id number] 

[place]



        15:21:23                                                 [id number]  [place]

 

        2023         Inpatient                 [place]  T[place]  [id number] 

[place]



        14:14:27                                                 [id number]  [place]

 

        2023         Inpatient                 [place]  T[place]  [id number] 

[place]



        15:41:11                                                 [id number]  [place]

 

        2023         Inpatient                 [place]  T[place]  [id number] 

[place]



        10:51:29                                                 [id number]  [place]

 

        2023         Inpatient                 [place]  T[place]  [id number] 

[place]



        12:08:58                                                 [id number]  [place]

 

        2023         Outpatient                 [facility]     [id number]

 [place]



        10:10:33                                                 [id number] [facility]

 

        2023         Outpatient                 [facility]     [id number]

 [place]



        14:51:27                                                 [id number] [facility]

 

        2023         Outpatient                 [facility]     [id number]

 [place]



        16:23:04                                                 [id number]  [facility]

 

        2023         Outpatient                 [facility]     [id number]

 [place]



        11:05:07                                                 [id number]  [facility]

 

        2022         Outpatient                 [facility]     [id number]

 [place]



        02:05:56                                                 [id number] [facility]

 

        2023 Su [place]   [place] -    [id number]

 [place]



        00:00:00 00:00:00 Debbi Jacques



                        PMHNP:                          [facility]         [place]

p



                        [address]                            PAULINE Patel [facility]



                        [id number]                                         [facility]



                        , Ph.                                           



                        ([phone])                                           



                        245--2023 Outpatient         JONE KELLY [facility]     [id number]

[address] [place]



        00:00:00 00:00:00                                                 [facility]

 

        2023 Outpatient                 [facility]     [id number]

[address] [place]



        00:00:00 00:00:00                                                 [facility]

 

        2023 Outpatient                 [facility]     [id number]

[id number] [place]



        00:00:00 00:00:00                                                 [facility]

 

        2023 Outpatient         JAYSHREE ISAACS [facility]     [id number]

[id number] [place]



        00:00:00 00:00:00                                                 [facility]

 

        2023 Emergency ER      GUZMAN,   [facility]    [id number]

[id number] [place]



        01:54:00 03:17:00                 [id number] [facility]

 

        2023 Outpatient         JONE KELLY [facility]     [id number]

[address] [place]



        00:00:00 00:00:00                                                 [facility]

 

        2023-06-15 2023-06-15 Outpatient         [facility]     [id number]

[address] [place]



        15:57:21 15:57:23                 [facility]

 

        2023-06-15 2023-06-15 Outpatient         [facility]     [id number]

[address] [place]



        12:02:32 12:57:52                 [facility]

 

        2023 Outpatient         [facility]     [id number]

[address] [place]



        00:00:00 00:00:00                 [facility]

 

        2023 Outpatient         [facility]     [id number]

[address] [place]



        00:00:00 00:00:00                 [facility]

 

        2023-05-15 2023-05-15 Outpatient         [facility]     [id number]

[id number] [place]



        12:50:13 12:50:13                 [facility]                           [facility]

 

        2023-05-15 2023-05-15 Outpatient         CHRISTOPHER [facility]     [id number]

[address] [place]



        11:40:27 12:47:42                 [facility]                           [facility]

 

        2023-05-15 2023-05-15 Office          GRANT White [id number]

[id number] [place]



        11:00:00 12:47:42 Visit           Luis YAYA VENEGAS     [facility].[id number]         [facility]  [id number]         



                                                [place]  [id number]         

 

        2023 Outpatient         Nguyen_Tho MEHOP   MEHOP   [id number]

 [place]



        00:00:00 00:00:00                                         [id number]   da



                                                                        [facility]



                                                                        [facility]



                                                                        [facility]



                                                                        Outreac



                                                                        h



                                                                        Program

 

        2023 Outpatient         Nguyen_Tho MEHOP   MEHOP   1205

 [place]



        00:00:00 00:00:00                                         [id number]   [facility]



                                                                        [facility]



                                                                        al



                                                                        Health



                                                                        Outreac



                                                                        h



                                                                        Program

 

        2023 Outpatient         Nguyen_Tho MEHOP   MEHOP   1205

 [place]



        00:00:00 00:00:00                                         [id number]   da



                                                                        [facility]



                                                                        al



                                                                        Health



                                                                        Outreac



                                                                        h



                                                                        Program

 

        2023 Outpatient         Nguyen_Tho MEHOP   MEHOP   1205

 [place]



        00:00:00 00:00:00                                         [id number]   da



                                                                        [facility]



                                                                        al



                                                                        Health



                                                                        Outreac



                                                                        h



                                                                        Program

 

        2023 Outpatient         Nguyen_Tho MEHOP   MEHOP   1205

 [place]



        00:00:00 00:00:00                                         [id number]   da



                                                                        [facility]



                                                                        al



                                                                        Health



                                                                        Outreac



                                                                        h



                                                                        Program

 

        2023 Outpatient         Nguyen_Tho MEHOP   MEHOP   1205

 [place]



        00:00:00 00:00:00                                         [id number]   da



                                                                        [facility]



                                                                        al



                                                                        Health



                                                                        Outreac



                                                                        h



                                                                        Program

 

        2023 Outpatient         Nguyen_Tho MEHOP   MEHOP   1205

 [place]



        00:00:00 00:00:00                                         [id number]   da



                                                                        [facility]



                                                                        al



                                                                        Health



                                                                        Outreac



                                                                        h



                                                                        Program

 

        2023 Outpatient         Nguyen_Heath MEHOP   MEHOP   1205

 [place]



        00:00:00 00:00:00                                         [id number]   da



                                                                        [facility]



                                                                        al



                                                                        Health



                                                                        Outreac



                                                                        h



                                                                        Program

 

        2023 Outpatient         NgGeorgi [facility]   [facility]   1205

 [place]



        00:00:00 00:00:00                                         [id number]   da



                                                                        [facility]



                                                                        al



                                                                        Health



                                                                        Outreac



                                                                        h



                                                                        Program

 

        2023 [facility]   [place] -    [id number] [place]

[place]



        00:00:00 00:00:00 Debbi Gallegos APRN-NP-C:                         Sikh         Epi

scop



                        1700                            [facility]



                        [address]                                         [facility]



                        , Ph.                                           



                        (979)                                           



                        2023 Outpatient         Ngayan_Heath [facility]   [facility]OP   1205

 [place]



        00:00:00 00:00:00                                         [id number]   da



                                                                        [facility]



                                                                        al



                                                                        Health



                                                                        Outreac



                                                                        h



                                                                        Program

 

        2022 Outpatient         NgGeorgi [facility]   1205

 [place]



        00:00:00 00:00:00                                         [id number]   da



                                                                        [facility]



                                                                        al



                                                                        Health



                                                                        Outreac



                                                                        h



                                                                        Program

 

        2022 [facility]   [place] -    [id number] 

[place]



        00:00:00 00:00:00 MD Brendan:                         Debbi conley



                        1700                            Sikh         [facility]

p



                        [facility]



                        [address]                                         h



                        , Ph.                                           Program



                        (979)                                           



                        2022 Outpatient         Nguyen_Annieo [facility]   MEHOP   1205

 [place]



        00:00:00 00:00:00                                         [id number]   da



                                                                        [facility]



                                                                        al



                                                                        Health



                                                                        Outreac



                                                                        h



                                                                        Program

 

        2022 Outpatient         Nguyen_Tho [facility]   [facility]OP   1205

 [place]



        00:00:00 00:00:00                                         [id number]   da



                                                                        [facility]



                                                                        al



                                                                        Health



                                                                        Outreac



                                                                        h



                                                                        Program

 

        2022 [facility]   [place] -    [id number] M

[place]



        00:00:00 00:00:00 Debbi Gallegos APRN-NP-C:                         Sikh         Epi

scop



                        1700                            [facility]



                        [address], [facility]



                        [address]                                         [facility]



                        , Ph.                                           



                        ([phone])                                           



                        2022 Outpatient         [place] [address]

 [place]



        00:00:00 00:00:00                                         [id number]   da



                                                                        [facility]



                                                                        al



                                                                        Health



                                                                        Outreac



                                                                        h



                                                                        Program

 

        2022 Emergency         [place]rer, [facility] 1.2.840.1 [id number]

[id number] 

[id number]                              Methodi



        20:52:00 16:40:00                 BertoMason 23317.1.1         985  

   st



                                                3.430.2.7                 Hospit

a



                                                .[id number]                 l



                                                .8                      

 

        2022 Emergency         [facility]     064     [id number]

[address] [place] [address]



        00:00:00 00:00:00                 MUDASSIR                 985     [address]

di



                                                                        st

 

        2022 Outpatient         [place] [address]

 [place]



        00:00:00 00:00:00                                         [id number]   da



                                                                        [facility]



                                                                        al



                                                                        Health



                                                                        Outreac



                                                                        h



                                                                        Program

 

        2022 Travel                  [id number] [id number]

[id number] Methodi



        00:00:00 00:00:00                         86937.1.1 350.1.13.43 180     

st



                                                3.430.2.7 0.2.7.3.698         Ho

spita



                                                .3.676042 084.8           l



                                                .8                      

 

        2022 Outpatient         [place] [address]

 [place]



        00:00:00 00:00:00                                         [id number]   da



                                                                        [facility]



                                                                        [facility]



                                                                        Outreac



                                                                        h



                                                                        Program

 

        2022 [facility]   [place] -    [id number] M

[place]



        00:00:00 00:00:00 Debbi Gallegos APRN-NP-C:                         Sikh         Epi

scop



                        1700                            [facility]



                        [address]                                         [place]





                        , Ph.                                           



                        (979)                                           



                        2022 Outpatient         Ngchristianoen_Annieo [facility]   1205

 [place]



        00:00:00 00:00:00                                         [id number]   da



                                                                        [facility]



                                                                        al



                                                                        Health



                                                                        Outreac



                                                                        h



                                                                        Program

 

        2022-10-27 2022-10-27 Outpatient         Nguyen_Annieo [facility]   1205

34 [place]



        00:00:00 00:00:00                                         [id number]   da



                                                                        [facility]



                                                                        al



                                                                        Health



                                                                        Outreac



                                                                        



                                                                        Program

 

        2022-10-27 [facility]10-27 [facility]    [id number] 

[place]



        00:00:00 00:00:00 MD Brendan:                         Debbi conley



                        1700                            Sikh         [place]

p



                        [facility]



                        [address]                                         h



                        , Ph.                                           Program



                        (979)                                           



                        [phone]                                         

 

        2022-10-11 2022-10-11 Outpatient         Ngen_Heath [facility]   1205

 [place]



        00:00:00 00:00:00                                         [id number]   da



                                                                        [facility]



                                                                        al



                                                                        Health



                                                                        Outreac



                                                                        



                                                                        Program

 

        2022-10-11 2022-10-11 Kelsi                 [facility] -    [id number] M

[place]



        00:00:00 00:00:00 Josefina Bedoya,                         Sikh         [place]

p



                        NP: 111                         [facility],                         OB GYN [facility]



                        [address]                                         [place]

am



                        , Ph.                                           



                        ([phone])                                           



                        [phone]                                         

 

        2022-10-05 2022-10-05 Outpatient         Ngchristianoen_Annieo [facility]   1205

 [place]



        00:00:00 00:00:00                                         [id number]   da



                                                                        [facility]



                                                                        al



                                                                        Health



                                                                        Outreac



                                                                        h



                                                                        Program

 

        2022-10-05 2022-10-05 [facility]   [place] -    [id number] M

[place]



        00:00:00 00:00:00 Gallegos,                         Greenlee         da



                        APRN-NP-C:                         Sikh         Epi

scop



                        1700                            [facility]

h



                        Ave, [facility]



                        [address]                                         [place]



                        , Ph.                                           



                        (979)                                           



                        2022 Outpatient         Ngayan_Heath [facility]   1205

 [place]



        00:00:00 00:00:00                                         [id number]   da



                                                                        [facility]



                                                                        al



                                                                        Health



                                                                        Outreac



                                                                        h



                                                                        Program

 

        2022 [facility] -    [id number] 

[place]



        00:00:00 00:00:00 MD Brendan:                         Debbi conley



                        1700                            Sikh         [place]

p



                        [facility]



                        [address]                                         h



                        , Ph.                                           Program



                        (979)                                           



                        2022 Outpatient         [facility]guyen_[facility]   1205

 [place]



        00:00:00 00:00:00                                         [id number]   da



                                                                        [facility]



                                                                        al



                                                                        Health



                                                                        Outreac



                                                                        h



                                                                        Program

 

        2022 [facility] -    [id number] M

[place]



        00:00:00 00:00:00 Debbi Gallegos



                        APRN-NP-C:                         Sikh         Epi

scop



                        1700                            [facility]

h



                        Ave, [facility]



                        [address]                                         [place]



                        , Ph.                                           



                        (979)                                           



                        2022 Outpatient         Nguyen_Annieo [facility]   1205

 [place]



        00:00:00 00:00:00                                            da



                                                                        [facility]



                                                                        al



                                                                        Health



                                                                        Outreac



                                                                        h



                                                                        Program

 

        2022 Outpatient         Nguyen_o [facility]   1205

 [place]



        00:00:00 00:00:00                                            da



                                                                        [facility]



                                                                        al



                                                                        Health



                                                                        Outreac



                                                                        h



                                                                        Program

 

        2022 Emergency X       DAISY ROGERS [facility]    ERT     [id number]

[id number] [facility]



        21:40:00 01:50:00                                                 [facility]







Results







           Test Description Test Time  Test Comments Results    Result Comments 

Source









                    Urinalysis macro (dipstick) panel - Urine 2023 15:07:3

9 









                      Test Item  Value      Reference Range Interpretation Comme

nts









             Leukocytes (test code = Leukocytes) -                              

        

 

             Nitrite (test code = Nitrite) -                                    

  

 

             Urobilinogen (test code = Urobilinogen) -                          

            

 

             Protein (test code = Protein) -                                    

  

 

             pH (test code = pH) 7.5                                    

 

             Blood (test code = Blood) -                                      

 

             Specific Gravity (test code = Specific Gravity) 1.020              

                    

 

             Ketone (test code = Ketone) -                                      

 

             Bilirubin (test code = Bilirubin) -                                

      

 

             Glucose (test code = Glucose) -                                    

  

 

             Appearance (test code = Appearance) cloudy                         

        

 

             Color (test code = Color) dark yellow                            



[facility] 
[id number] 00:00:00





             Test Item    Value        Reference Range Interpretation Comments

 

             Bacteria identified in Urine by comment                            

    



             Culture (test code = 630-4)                                        



[facility] by 
[id number] 00:00:00





             Test Item    Value        Reference Range Interpretation Comments

 

             Bacteria identified in Urine by comment                            

    



             Culture (test code = 630-4)                                        



[facility] panel - Blood
2022 00:00:00





             Test Item    Value        Reference Range Interpretation Comments

 

             Leukocytes [#/volume] in Blood 4.5 x10e3/uL 3.4-10.8               

   



             by Automated count (test code =                                    

    



             6690-2)                                             

 

             Erythrocytes [#/volume] in 4.77 x10e6/uL 3.77-5.28                 



             Blood by Automated count (test                                     

   



             code = 789-8)                                        

 

             Hemoglobin [Mass/volume] in 13.3 g/dL    11.1-15.9                 



             Blood (test code = 718-7)                                        

 

             Hematocrit [Volume Fraction] of 40.5 %       34.0-46.6             

    



             Blood by Automated count (test                                     

   



             code = 4544-3)                                        

 

             Erythrocyte mean corpuscular 85 fL        79-97                    

 



             volume [Entitic volume] by                                        



             Automated count (test code =                                       

 



             787-2)                                              

 

             Erythrocyte mean corpuscular 27.9 pg      26.6-33.0                

 



             hemoglobin [Entitic mass] by                                       

 



             Automated count (test code =                                       

 



             785-6)                                              

 

             Erythrocyte mean corpuscular 32.8 g/dL    31.5-35.7                

 



             hemoglobin concentration                                        



             [Mass/volume] by Automated                                        



             count (test code = 786-4)                                        

 

             Erythrocyte distribution width 13.2 %       11.7-15.4              

   



             [Ratio] by Automated count                                        



             (test code = 788-0)                                        

 

             Platelets [#/volume] in Blood 241 x10e3/uL 150-450                 

  



             by Automated count (test code =                                    

    



             777-3)                                              

 

             Neutrophils/100 leukocytes in 43 %         not estab.              

  



             Blood by Automated count (test                                     

   



             code = 770-8)                                        

 

             Lymphocytes/100 leukocytes in 40 %         not estab.              

  



             Blood by Automated count (test                                     

   



             code = 736-9)                                        

 

             Monocytes/100 leukocytes in 8 %          not estab.                



             Blood by Automated count (test                                     

   



             code = 5905-5)                                        

 

             Eosinophils/100 leukocytes in 8 %          not estab.              

  



             Blood by Automated count (test                                     

   



             code = 713-8)                                        

 

             Basophils/100 leukocytes in 1 %          not estab.                



             Blood by Automated count (test                                     

   



             code = 706-2)                                        

 

             immature cells (test code = np                                     



             immature cells)                                        

 

             Neutrophils [#/volume] in Blood 1.9 x10e3/uL 1.4-7.0               

    



             by Automated count (test code =                                    

    



             751-8)                                              

 

             Lymphocytes [#/volume] in Blood 1.8 x10e3/uL 0.7-3.1               

    



             by Automated count (test code =                                    

    



             731-0)                                              

 

             Monocytes [#/volume] in Blood 0.3 x10e3/uL 0.1-0.9                 

  



             by Automated count (test code =                                    

    



             742-7)                                              

 

             Eosinophils [#/volume] in Blood 0.4 x10e3/uL 0.0-0.4               

    



             by Automated count (test code =                                    

    



             711-2)                                              

 

             Basophils [#/volume] in Blood 0.1 x10e3/uL 0.0-0.2                 

  



             by Automated count (test code =                                    

    



             704-7)                                              

 

             Immature granulocytes/100 0 %          not estab.                



             leukocytes in Blood by                                        



             Automated count (test code =                                       

 



             52048-7)                                            

 

             Immature granulocytes 0.0 x10e3/uL 0.0-0.1                   



             [#/volume] in Blood by                                        



             Automated count (test code =                                       

 



             14446-2)                                            

 

             Nucleated erythrocytes/100 np                                     



             leukocytes [Ratio] in Blood by                                     

   



             Automated count (test code =                                       

 



             78124-1)                                            

 

             Morphology [Interpretation] in np                                  

   



             Blood Narrative (test code =                                       

 



             36742-3)                                            



[facility] - 
Serum or [id number] 00:00:00





             Test Item    Value        Reference Range Interpretation Comments

 

             Glucose [Mass/volume] in 82 mg/dL     70-99                     



             Serum or Plasma (test code =                                       

 



             2345-7)                                             

 

             Urea nitrogen [Mass/volume] 8 mg/dL      6-20                      



             in Serum or Plasma (test code                                      

  



             = 3094-0)                                           

 

             Creatinine [Mass/volume] in 0.84 mg/dL   0.57-1.00                 



             Serum or Plasma (test code =                                       

 



             2160-0)                                             

 

             eGFR (test code = eGFR) 100 mL/min/1.73 >59                       

 

             Urea nitrogen/Creatinine 10           9-23                      



             [Mass Ratio] in Serum or                                        



             Plasma (test code = 3097-3)                                        

 

             Sodium [Moles/volume] in 138 mmol/L   134-144                   



             Serum or Plasma (test code =                                       

 



             2951-2)                                             

 

             Potassium [Moles/volume] in 3.9 mmol/L   3.5-5.2                   



             Serum or Plasma (test code =                                       

 



             2823-3)                                             

 

             Chloride [Moles/volume] in 101 mmol/L                       



             Serum or Plasma (test code =                                       

 



             5-0)                                             

 

             Carbon dioxide, total 21 mmol/L    20-29                     



             [Moles/volume] in Serum or                                        



             Plasma (test code = -9)                                        

 

             Calcium [Mass/volume] in 9.4 mg/dL    8.7-10.2                  



             Serum or Plasma (test code =                                       

 



             88410-0)                                            

 

             Protein [Mass/volume] in 7.4 g/dL     6.0-8.5                   



             Serum or Plasma (test code =                                       

 



             2885-2)                                             

 

             Albumin [Mass/volume] in 4.9 g/dL     3.9-5.0                   



             Serum or Plasma (test code =                                       

 



             1751-7)                                             

 

             Globulin [Mass/volume] in 2.5 g/dL     1.5-4.5                   



             Serum by calculation (test                                        



             code = 74276-0)                                        

 

             Albumin/Globulin [Mass Ratio] 2.0          1.2-2.2                 

  



             in Serum or Plasma (test code                                      

  



             = 1759-0)                                           

 

             Bilirubin.total [Mass/volume] 0.9 mg/dL    0.0-1.2                 

  



             in Serum or Plasma (test code                                      

  



             = -)                                           

 

             Alkaline phosphatase 41 IU/L             L            



             [Enzymatic activity/volume]                                        



             in Serum or Plasma (test code                                      

  



             = 6768-6)                                           

 

             Aspartate aminotransferase 13 IU/L      0-40                      



             [Enzymatic activity/volume]                                        



             in Serum or Plasma (test code                                      

  



             = 1920-8)                                           

 

             Alanine aminotransferase 6 IU/L       0-32                      



             [Enzymatic activity/volume]                                        



             in Serum or Plasma (test code                                      

  



             = 1742-6)                                           



[facility] 
[Interpretation] in Serum or [id number] 00:00:00





             Test Item    Value        Reference Range Interpretation Comments

 

             Cobalamin (Vitamin B12) [Mass/volume] >2000        232-1245     H  

          



             in Serum or Plasma (test code = 2132-9)                            

            

 

             Folate [Mass/volume] in Serum or Plasma >20.0        >3.0          

            



             (test code = 2284-8)                                        



[facility]
2022 00:00:00





             Test Item    Value        Reference Range Interpretation Comments

 

             Leukocytes [#/volume] in Blood 4.5 x10e3/uL 3.4-10.8               

   



             by Automated count (test code =                                    

    



             6690-2)                                             

 

             Erythrocytes [#/volume] in 4.77 x10e6/uL 3.77-5.28                 



             Blood by Automated count (test                                     

   



             code = 789-8)                                        

 

             Hemoglobin [Mass/volume] in 13.3 g/dL    11.1-15.9                 



             Blood (test code = 718-7)                                        

 

             Hematocrit [Volume Fraction] of 40.5 %       34.0-46.6             

    



             Blood by Automated count (test                                     

   



             code = 4544-3)                                        

 

             Erythrocyte mean corpuscular 85 fL        79-97                    

 



             volume [Entitic volume] by                                        



             Automated count (test code =                                       

 



             787-2)                                              

 

             Erythrocyte mean corpuscular 27.9 pg      26.6-33.0                

 



             hemoglobin [Entitic mass] by                                       

 



             Automated count (test code =                                       

 



             785-6)                                              

 

             Erythrocyte mean corpuscular 32.8 g/dL    31.5-35.7                

 



             hemoglobin concentration                                        



             [Mass/volume] by Automated                                        



             count (test code = 786-4)                                        

 

             Erythrocyte distribution width 13.2 %       11.7-15.4              

   



             [Ratio] by Automated count                                        



             (test code = 788-0)                                        

 

             Platelets [#/volume] in Blood 241 x10e3/uL 150-450                 

  



             by Automated count (test code =                                    

    



             777-3)                                              

 

             Neutrophils/100 leukocytes in 43 %         not estab.              

  



             Blood by Automated count (test                                     

   



             code = 770-8)                                        

 

             Lymphocytes/100 leukocytes in 40 %         not estab.              

  



             Blood by Automated count (test                                     

   



             code = 736-9)                                        

 

             Monocytes/100 leukocytes in 8 %          not estab.                



             Blood by Automated count (test                                     

   



             code = 5905-5)                                        

 

             Eosinophils/100 leukocytes in 8 %          not estab.              

  



             Blood by Automated count (test                                     

   



             code = 713-8)                                        

 

             Basophils/100 leukocytes in 1 %          not estab.                



             Blood by Automated count (test                                     

   



             code = 706-2)                                        

 

             immature cells (test code = np                                     



             immature cells)                                        

 

             Neutrophils [#/volume] in Blood 1.9 x10e3/uL 1.4-7.0               

    



             by Automated count (test code =                                    

    



             751-8)                                              

 

             Lymphocytes [#/volume] in Blood 1.8 x10e3/uL 0.7-3.1               

    



             by Automated count (test code =                                    

    



             731-0)                                              

 

             Monocytes [#/volume] in Blood 0.3 x10e3/uL 0.1-0.9                 

  



             by Automated count (test code =                                    

    



             742-7)                                              

 

             Eosinophils [#/volume] in Blood 0.4 x10e3/uL 0.0-0.4               

    



             by Automated count (test code =                                    

    



             711-2)                                              

 

             Basophils [#/volume] in Blood 0.1 x10e3/uL 0.0-0.2                 

  



             by Automated count (test code =                                    

    



             704-7)                                              

 

             Immature granulocytes/100 0 %          not estab.                



             leukocytes in Blood by                                        



             Automated count (test code =                                       

 



             44545-4)                                            

 

             Immature granulocytes 0.0 x10e3/uL 0.0-0.1                   



             [#/volume] in Blood by                                        



             Automated count (test code =                                       

 



             27916-1)                                            

 

             Nucleated erythrocytes/100 np                                     



             leukocytes [Ratio] in Blood by                                     

   



             Automated count (test code =                                       

 



             75817-8)                                            

 

             Morphology [Interpretation] in np                                  

   



             Blood Narrative (test code =                                       

 



             57110-6)                                            



[facility] 2000 [facility] - 
Serum or [id number] 00:00:00





             Test Item    Value        Reference Range Interpretation Comments

 

             Glucose [Mass/volume] in 82 mg/dL     70-99                     



             Serum or Plasma (test code =                                       

 



             2345-7)                                             

 

             Urea nitrogen [Mass/volume] 8 mg/dL      6-20                      



             in Serum or Plasma (test code                                      

  



             = 3094-0)                                           

 

             Creatinine [Mass/volume] in 0.84 mg/dL   0.57-1.00                 



             Serum or Plasma (test code =                                       

 



             2160-0)                                             

 

             eGFR (test code = eGFR) 100 mL/min/1.73 >59                       

 

             Urea nitrogen/Creatinine 10           9-23                      



             [Mass Ratio] in Serum or                                        



             Plasma (test code = 3097-3)                                        

 

             Sodium [Moles/volume] in 138 mmol/L   134-144                   



             Serum or Plasma (test code =                                       

 



             2951-2)                                             

 

             Potassium [Moles/volume] in 3.9 mmol/L   3.5-5.2                   



             Serum or Plasma (test code =                                       

 



             2823-3)                                             

 

             Chloride [Moles/volume] in 101 mmol/L                       



             Serum or Plasma (test code =                                       

 



             5-0)                                             

 

             Carbon dioxide, total 21 mmol/L    20-29                     



             [Moles/volume] in Serum or                                        



             Plasma (test code = -9)                                        

 

             Calcium [Mass/volume] in 9.4 mg/dL    8.7-10.2                  



             Serum or Plasma (test code =                                       

 



             50455-0)                                            

 

             Protein [Mass/volume] in 7.4 g/dL     6.0-8.5                   



             Serum or Plasma (test code =                                       

 



             2885-2)                                             

 

             Albumin [Mass/volume] in 4.9 g/dL     3.9-5.0                   



             Serum or Plasma (test code =                                       

 



             1751-7)                                             

 

             Globulin [Mass/volume] in 2.5 g/dL     1.5-4.5                   



             Serum by calculation (test                                        



             code = 90112-8)                                        

 

             Albumin/Globulin [Mass Ratio] 2.0          1.2-2.2                 

  



             in Serum or Plasma (test code                                      

  



             = 1759-0)                                           

 

             Bilirubin.total [Mass/volume] 0.9 mg/dL    0.0-1.2                 

  



             in Serum or Plasma (test code                                      

  



             = -2)                                           

 

             Alkaline phosphatase 41 IU/L             L            



             [Enzymatic activity/volume]                                        



             in Serum or Plasma (test code                                      

  



             = 6768-6)                                           

 

             Aspartate aminotransferase 13 IU/L      0-40                      



             [Enzymatic activity/volume]                                        



             in Serum or Plasma (test code                                      

  



             = 1920-8)                                           

 

             Alanine aminotransferase 6 IU/L       0-32                      



             [Enzymatic activity/volume]                                        



             in Serum or Plasma (test code                                      

  



             = 1742-6)                                           



[facility] ProgramF[facility]yanocobalamin 
[Interpretation] in Serum or [id number] 00:00:00





             Test Item    Value        Reference Range Interpretation Comments

 

             Cobalamin (Vitamin B12) [Mass/volume] >2000        232-1245     H  

          



             in Serum or Plasma (test code = -9)                            

            

 

             Folate [Mass/volume] in Serum or Plasma >20.0        >3.0          

            



             (test code = 2284-8)                                        



[facility]dip[facility] - 
[id number] 15:14:20





             Test Item    Value        Reference Range Interpretation Comments

 

             Leukocytes (test code = +                                      



             Leukocytes)                                         

 

             Nitrite (test code = Nitrite) -                                    

  

 

             Urobilinogen (test code = -                                      



             Urobilinogen)                                        

 

             Protein (test code = Protein) 1+                                   

  

 

             pH (test code = pH) 6.5                                    

 

             Blood (test code = Blood) -                                      

 

             Specific Gravity (test code = 1.020                                

  



             Specific Gravity)                                        

 

             Ketone (test code = Ketone) +                                      

 

             Bilirubin (test code = 1+                                     



             Bilirubin)                                          

 

             Glucose (test code = Glucose) -                                    

  

 

             Appearance (test code = condensated                            



             Appearance)                                         

 

             Color (test code = Color) dark orange.                           



[facility] Hospital[facility] macro (dipstick[facility] panel - 
[id number] 15:14:20





             Test Item    Value        Reference Range Interpretation Comments

 

             Leukocytes (test code = +                                      



             Leukocytes)                                         

 

             Nitrite (test code = Nitrite) -                                    

  

 

             Urobilinogen (test code = -                                      



             Urobilinogen)                                        

 

             Protein (test code = Protein) 1+                                   

  

 

             pH (test code = pH) 6.5                                    

 

             Blood (test code = Blood) -                                      

 

             Specific Gravity (test code = 1.020                                

  



             Specific Gravity)                                        

 

             Ketone (test code = Ketone) +                                      

 

             Bilirubin (test code = 1+                                     



             Bilirubin)                                          

 

             Glucose (test code = Glucose) -                                    

  

 

             Appearance (test code = condensated                            



             Appearance)                                         

 

             Color (test code = Color) dark orange.                           



[facility] Hospital[facility] macro (dipstick[facility] panel - 
[id number] 15:14:20





             Test Item    Value        Reference Range Interpretation Comments

 

             Leukocytes (test code = +                                      



             Leukocytes)                                         

 

             Nitrite (test code = Nitrite) -                                    

  

 

             Urobilinogen (test code = -                                      



             Urobilinogen)                                        

 

             Protein (test code = Protein) 1+                                   

  

 

             pH (test code = pH) 6.5                                    

 

             Blood (test code = Blood) -                                      

 

             Specific Gravity (test code = 1.020                                

  



             Specific Gravity)                                        

 

             Ketone (test code = Ketone) +                                      

 

             Bilirubin (test code = 1+                                     



             Bilirubin)                                          

 

             Glucose (test code = Glucose) -                                    

  

 

             Appearance (test code = condensated                            



             Appearance)                                         

 

             Color (test code = Color) dark orange.                           



[facility] HospitalUrine [id number] 21:27:00





             Test Item    Value        Reference Range Interpretation Comments

 

             Urine culture Mixed you                            Specimen



             isolate (test <=10-3 col/cc                           InformationSp

ecimen



             code = 30812-3)                                        Source: Urin

eSpecimen



                                                                 Site: [facility] cat

ch



[facility] [id number] 16:26:39





             Test Item    Value        Reference Range Interpretation Comments

 

             Ventricular rate (test 80                                     



             code = 253)                                         

 

             Atrial rate (test code 80                                     



             = 255)                                              

 

             UT interval (test code 132                                    



             = 266)                                              

 

             QRSD interval (test 80                                     



             code = 260)                                         

 

             QT interval (test code 370                                    



             = 264)                                              

 

             QTC interval (test 426                                    



             code = 265)                                         

 

             P axis 1 (test code = 74                                     



             267)                                                

 

             QRS axis 1 (test code 78                                     



             = 268)                                              

 

             T wave axis (test code 43                                     



             = 270)                                              

 

             EKG impression (test Normal sinus                           



             code = 273)  rhythm-Normal ECG-In                           



                          automated comparison                           



                          with ECG of 2022                           



                          20:33,-T wave inversion                           



                          no longer evident in                           



                          Inferior leads-T wave                           



                          inversion no longer                           



                          evident in                             



                          Anterolateral                           



                          leads-Electronically                           



                          Signed By Beau Griffith MD                           



                          ([id number]) on 2022                           



                          10:26:33 AM                            



[facility] qualitative, urine [id number] 06:23:00





             Test Item    Value        Reference Range Interpretation Comments

 

             hCG qualitative, Negative                               The manumelvinac

turer?s stated



             urine (test code =                                        sensitivi

ty of this assay



             6-3)                                             is 20 mIU/mL in

 urine.



[facility]Presence] in Respiratory specimen 
by JOAN with probe [id number] 23:12:31





             Test Item    Value        Reference Range Interpretation Comments

 

             SARS-CoV-2 (COVID-19) RNA Not detected                           



             [Presence] in Respiratory                                        



             specimen by JOAN with probe                                        



             detection (test code = 14191-9)                                    

    

 

             Whether patient is employed in a Unknown                           

     



             healthcare setting (test code =                                    

    



             37008-0)                                            

 

             Whether the patient has symptoms Unknown                           

     



             related to condition of interest                                   

     



             (test code = 18969-3)                                        

 

             Whether the patient was Unknown                                



             hospitalized for condition of                                      

  



             interest (test code = 66350-1)                                     

   

 

             Whether the patient was admitted Unknown                           

     



             to intensive care unit (ICU) for                                   

     



             condition of interest (test code                                   

     



             = 88360-0)                                          

 

             Whether patient resides in a Unknown                               

 



             congregate care setting (test                                      

  



             code = 55272-1)                                        

 

             Pregnancy status (test code = Unknown                              

  



             59923-5)                                            

 

             Date and time of symptom onset Unknown                             

   



             (test code = 06608-6)                                        



[facility] [id number] [facility]:00:00





             Test Item    Value        Reference Range Interpretation Comments

 

             Leukocytes [#/volume] in Blood 6.3 x10e3/uL 3.4-10.8               

   



             by Automated count (test code =                                    

    



             6690-2)                                             

 

             Erythrocytes [#/volume] in 4.63 x10e6/uL 3.77-5.28                 



             Blood by Automated count (test                                     

   



             code = 789-8)                                        

 

             Hemoglobin [Mass/volume] in 13.1 g/dL    11.1-15.9                 



             Blood (test code = 718-7)                                        

 

             Hematocrit [Volume Fraction] of 40.5 %       34.0-46.6             

    



             Blood by Automated count (test                                     

   



             code = 4544-3)                                        

 

             Erythrocyte mean corpuscular 88 fL        79-97                    

 



             volume [Entitic volume] by                                        



             Automated count (test code =                                       

 



             787-2)                                              

 

             Erythrocyte mean corpuscular 28.3 pg      26.6-33.0                

 



             hemoglobin [Entitic mass] by                                       

 



             Automated count (test code =                                       

 



             785-6)                                              

 

             Erythrocyte mean corpuscular 32.3 g/dL    31.5-35.7                

 



             hemoglobin concentration                                        



             [Mass/volume] by Automated                                        



             count (test code = 786-4)                                        

 

             Erythrocyte distribution width 13.1 %       11.7-15.4              

   



             [Ratio] by Automated count                                        



             (test code = 788-0)                                        

 

             Platelets [#/volume] in Blood 266 x10e3/uL 150-450                 

  



             by Automated count (test code =                                    

    



             777-3)                                              

 

             Neutrophils/100 leukocytes in 47 %         not estab.              

  



             Blood by Automated count (test                                     

   



             code = 770-8)                                        

 

             Lymphocytes/100 leukocytes in 37 %         not estab.              

  



             Blood by Automated count (test                                     

   



             code = 736-9)                                        

 

             Monocytes/100 leukocytes in 7 %          not estab.                



             Blood by Automated count (test                                     

   



             code = 5905-5)                                        

 

             Eosinophils/100 leukocytes in 8 %          not estab.              

  



             Blood by Automated count (test                                     

   



             code = 713-8)                                        

 

             Basophils/100 leukocytes in 1 %          not estab.                



             Blood by Automated count (test                                     

   



             code = 706-2)                                        

 

             immature cells (test code = np                                     



             immature cells)                                        

 

             Neutrophils [#/volume] in Blood 2.9 x10e3/uL 1.4-7.0               

    



             by Automated count (test code =                                    

    



             751-8)                                              

 

             Lymphocytes [#/volume] in Blood 2.3 x10e3/uL 0.7-3.1               

    



             by Automated count (test code =                                    

    



             731-0)                                              

 

             Monocytes [#/volume] in Blood 0.4 x10e3/uL 0.1-0.9                 

  



             by Automated count (test code =                                    

    



             742-7)                                              

 

             Eosinophils [#/volume] in Blood 0.5 x10e3/uL 0.0-0.4      H        

    



             by Automated count (test code =                                    

    



             711-2)                                              

 

             Basophils [#/volume] in Blood 0.1 x10e3/uL 0.0-0.2                 

  



             by Automated count (test code =                                    

    



             704-7)                                              

 

             Immature granulocytes/100 0 %          not estab.                



             leukocytes in Blood by                                        



             Automated count (test code =                                       

 



             46870-0)                                            

 

             Immature granulocytes 0.0 x10e3/uL 0.0-0.1                   



             [#/volume] in Blood by                                        



             Automated count (test code =                                       

 



             54400-4)                                            

 

             Nucleated erythrocytes/100 np                                     



             leukocytes [Ratio] in Blood by                                     

   



             Automated count (test code =                                       

 



             05133-1)                                            

 

             Morphology [Interpretation] in np                                  

   



             Blood Narrative (test code =                                       

 



             50512-2)                                            



[facility] 
Serum or [id number] 00:00:00





             Test Item    Value        Reference Range Interpretation Comments

 

             Glucose [Mass/volume] in 59 mg/dL     70-99        L            



             Serum or Plasma (test code =                                       

 



             2345-7)                                             

 

             Urea nitrogen [Mass/volume] 8 mg/dL      6-20                      



             in Serum or Plasma (test code                                      

  



             = 3094-0)                                           

 

             Creatinine [Mass/volume] in 0.84 mg/dL   0.57-1.00                 



             Serum or Plasma (test code =                                       

 



             2160-0)                                             

 

             eGFR (test code = eGFR) 100 mL/min/1.73 >59                       

 

             Urea nitrogen/Creatinine 10           9-23                      



             [Mass Ratio] in Serum or                                        



             Plasma (test code = 3097-3)                                        

 

             Sodium [Moles/volume] in 141 mmol/L   134-144                   



             Serum or Plasma (test code =                                       

 



             2951-2)                                             

 

             Potassium [Moles/volume] in 3.6 mmol/L   3.5-5.2                   



             Serum or Plasma (test code =                                       

 



             2823-3)                                             

 

             Chloride [Moles/volume] in 99 mmol/L                        



             Serum or Plasma (test code =                                       

 



             2075-0)                                             

 

             Carbon dioxide, total 18 mmol/L    20-29        L            



             [Moles/volume] in Serum or                                        



             Plasma (test code = -)                                        

 

             Calcium [Mass/volume] in 9.8 mg/dL    8.7-10.2                  



             Serum or Plasma (test code =                                       

 



             86311-9)                                            

 

             Protein [Mass/volume] in 7.7 g/dL     6.0-8.5                   



             Serum or Plasma (test code =                                       

 



             2885-2)                                             

 

             Albumin [Mass/volume] in 5.1 g/dL     3.9-5.0      H            



             Serum or Plasma (test code =                                       

 



             1751-7)                                             

 

             Globulin [Mass/volume] in 2.6 g/dL     1.5-4.5                   



             Serum by calculation (test                                        



             code = 12423-8)                                        

 

             Albumin/Globulin [Mass Ratio] 2.0          1.2-2.2                 

  



             in Serum or Plasma (test code                                      

  



             = 1759-0)                                           

 

             Bilirubin.total [Mass/volume] 0.7 mg/dL    0.0-1.2                 

  



             in Serum or Plasma (test code                                      

  



             = -)                                           

 

             Alkaline phosphatase 51 IU/L                          



             [Enzymatic activity/volume]                                        



             in Serum or Plasma (test code                                      

  



             = 6768-6)                                           

 

             Aspartate aminotransferase 18 IU/L      0-40                      



             [Enzymatic activity/volume]                                        



             in Serum or Plasma (test code                                      

  



             = 1920-8)                                           

 

             Alanine aminotransferase 8 IU/L       0-32                      



             [Enzymatic activity/volume]                                        



             in Serum or Plasma (test code                                      

  



             = 1742-6)                                           



[facility] 
[id number] 00:00:00





             Test Item    Value        Reference Range Interpretation Comments

 

             Bacteria identified in Urine by comment                            

    



             Culture (test code = 630-4)                                        



[facility] Hospital[facility]inal[facility] macro (dipstick[facility] panel - 
[id number] 17:53:00





             Test Item    Value        Reference Range Interpretation Comments

 

             Leukocytes (test code = -                                      



             Leukocytes)                                         

 

             Nitrite (test code = -                                      



             Nitrite)                                            

 

             Urobilinogen (test code = -                                      



             Urobilinogen)                                        

 

             Protein (test code = 1+                                     



             Protein)                                            

 

             pH (test code = pH) 6.0                                    

 

             Blood (test code = Blood) -                                      

 

             Specific Gravity (test 1.030                                  



             code = Specific Gravity)                                        

 

             Ketone (test code = 3+                                     



             Ketone)                                             

 

             Bilirubin (test code = -                                      



             Bilirubin)                                          

 

             Glucose (test code = +-                                     



             Glucose)                                            

 

             Appearance (test code = cloudy                                 



             Appearance)                                         

 

             Color (test code = Color) dark yellow/orange                       

    



[facility] Hospital[facility] (dipstick[facility] panel - 
[id number] 17:53:00





             Test Item    Value        Reference Range Interpretation Comments

 

             Leukocytes (test code = -                                      



             Leukocytes)                                         

 

             Nitrite (test code = -                                      



             Nitrite)                                            

 

             Urobilinogen (test code = -                                      



             Urobilinogen)                                        

 

             Protein (test code = 1+                                     



             Protein)                                            

 

             pH (test code = pH) 6.0                                    

 

             Blood (test code = Blood) -                                      

 

             Specific Gravity (test 1.030                                  



             code = Specific Gravity)                                        

 

             Ketone (test code = 3+                                     



             Ketone)                                             

 

             Bilirubin (test code = -                                      



             Bilirubin)                                          

 

             Glucose (test code = +-                                     



             Glucose)                                            

 

             Appearance (test code = cloudy                                 



             Appearance)                                         

 

             Color (test code = Color) dark yellow/orange                       

    



[facility] HospitalGlucose [Mass/volume] in Capillary 
[id number] 17:35:27





             Test Item    Value        Reference Range Interpretation Comments

 

             Blood Glucose: mg/dl (test code = Blood 64                         

            



             Glucose: mg/dl)                                        



[facility] HospitalGlucose [Mass/volume] in Capillary 
[id number] 17:35:27





             Test Item    Value        Reference Range Interpretation Comments

 

             Blood Glucose: mg/dl (test code = Blood 64                         

            



             Glucose: mg/dl)                                        



[facility] 
[id number] 17:17:01





             Test Item    Value        Reference Range Interpretation Comments

 

             Blood Glucose: mg/dl (test code = Blood 56                         

            



             Glucose: mg/dl)                                        



[facility] HospitalGlucose [Mass/volume] in Capillary 
[id number] 17:17:01





             Test Item    Value        Reference Range Interpretation Comments

 

             Blood Glucose: mg/dl (test code = Blood 56                         

            



             Glucose: mg/dl)                                        



[facility] HospitalFree T4 and TSH panel - Serum or 
Plasma2022-10-12 00:00:00





             Test Item    Value        Reference Range Interpretation Comments

 

             Thyrotropin [Units/volume] in 0.544 uIU/mL 0.450-4.500             

  



             Serum or Plasma by Detection                                       

 



             limit <= 0.005 mIU/L (test code                                    

    



             = 43088-5)                                          

 

             Thyroxine (T4) free 0.97 NG/dL   0.82-1.77                 



             [Mass/volume] in Serum or Plasma                                   

     



             (test code = 3024-7)                                        



[facility] HospitalTestosterone [facility] total panel 
[Mass/volume] - Serum or Plasma2022-10-12 00:00:00





             Test Item    Value        Reference Range Interpretation Comments

 

             Testosterone [Mass/volume] in Serum 26 NG/dL     13-71             

        



             or Plasma (test code = 2986-8)                                     

   

 

             Testosterone Free [Mass/volume] in 1.6 pg/mL    0.0-4.2            

       



             Serum or Plasma (test code =                                       

 



             2991-8)                                             



[facility] HospitalHemoglobin A1c/Hemoglobin.total in 
Blood2022-10-12 00:00:00





             Test Item    Value        Reference Range Interpretation Comments

 

             Hemoglobin A1c/Hemoglobin.total in 5.3 %        4.8-5.6            

       



             Blood (test code = 4548-4)                                        



[facility] HospitalDehydroepiandrosterone sulfate (DHEA-
S) [Mass/volume] in Serum or Plasma2022-10-12 00:00:00





             Test Item    Value        Reference Range Interpretation Comments

 

             Dehydroepiandrosterone sulfate 205.0 ug/dL  110.0-431.7            

   



             (DHEA-S) [Mass/volume] in Serum                                    

    



             or Plasma (test code = 2191-5)                                     

   



[facility] HospitalChoriogonadotropin.intact+Beta 
subunit [Units/volume] in Serum or Plasma2022-10-12 00:00:00





             Test Item    Value        Reference Range Interpretation Comments

 

             Choriogonadotropin.intact+Beta subunit <1                          

           



             [Units/volume] in Serum or Plasma (test                            

            



             code = 90017-2)                                        



[facility] HospitalProlactin [Mass/volume] in Serum or 
Plasma2022-10-12 00:00:00





             Test Item    Value        Reference Range Interpretation Comments

 

             Prolactin [Mass/volume] in Serum 14.7 NG/mL   4.8-23.3             

     



             or Plasma (test code = 2842-3)                                     

   



[facility] HospitalHIV 1 and 2 tests - Meaningful Use 
set2022-10-12 00:00:00





             Test Item    Value        Reference Range Interpretation Comments

 

             HIV 1+2 Ab+HIV1 p24 Ag non reactive non reactive              



             [Presence] in Serum or Plasma by                                   

     



             Immunoassay (test code =                                        



             32874-5)                                            



[facility] HospitalInsulin [Units/volume] in Serum or 
Plasma2022-10-12 00:00:00





             Test Item    Value        Reference Range Interpretation Comments

 

             Insulin [Units/volume] in Serum or 2.3 uIU/mL   2.6-24.9     L     

       



             Plasma (test code = 36336-7)                                       

 



[facility] Serum or 
Plasma2022-10-12 00:00:00





             Test Item    Value        Reference Range Interpretation Comments

 

             Thyrotropin [Units/volume] in 0.544 uIU/mL 0.450-4.500             

  



             Serum or Plasma by Detection                                       

 



             limit <= 0.005 mIU/L (test code                                    

    



             = 79747-2)                                          

 

             Thyroxine (T4) free 0.97 NG/dL   0.82-1.77                 



             [Mass/volume] in Serum or Plasma                                   

     



             (test code = 3024-7)                                        



[facility]Test[facility] 
[Mass/volume] - Serum or Plasma2022-10-12 00:00:00





             Test Item    Value        Reference Range Interpretation Comments

 

             Testosterone [Mass/volume] in Serum 26 NG/dL     13-71             

        



             or Plasma (test code = 2986-8)                                     

   

 

             Testosterone Free [Mass/volume] in 1.6 pg/mL    0.0-4.2            

       



             Serum or Plasma (test code =                                       

 



             2991-8)                                             



[facility] HospitalH[facility] A[facility]c/Hemoglobin.total in 
Blood2022-10-12 00:00:00





             Test Item    Value        Reference Range Interpretation Comments

 

             Hemoglobin A1c/Hemoglobin.total in 5.3 %        4.8-5.6            

       



             Blood (test code = 4548-4)                                        



[facility] HospitalDehydroepiandrosterone sulfate (DHEA-
S) [Mass/volume] in Serum or Plasma2022-10-12 00:00:00





             Test Item    Value        Reference Range Interpretation Comments

 

             Dehydroepiandrosterone sulfate 205.0 ug/dL  110.0-431.7            

   



             (DHEA-S) [Mass/volume] in Serum                                    

    



             or Plasma (test code = 2191-5)                                     

   



[facility] HospitalChoriogonadotropin.intact+Beta 
subunit [Units/volume] in Serum or Plasma2022-10-12 00:00:00





             Test Item    Value        Reference Range Interpretation Comments

 

             Choriogonadotropin.intact+Beta subunit <1                          

           



             [Units/volume] in Serum or Plasma (test                            

            



             code = 63100-3)                                        



[facility] HospitalProlactin [Mass/volume] in Serum or 
Plasma2022-10-12 00:00:00





             Test Item    Value        Reference Range Interpretation Comments

 

             Prolactin [Mass/volume] in Serum 14.7 NG/mL   4.8-23.3             

     



             or Plasma (test code = 2842-3)                                     

   



[facility] HospitalHIV [facility] and 2 tests - Meaningful Use 
set2022-10-12 00:00:00





             Test Item    Value        Reference Range Interpretation Comments

 

             HIV 1+2 Ab+HIV1 p24 Ag non reactive non reactive              



             [Presence] in Serum or Plasma by                                   

     



             Immunoassay (test code =                                        



             80023-6)                                            



[facility] HospitalInsulin [Units/volume] in Serum or 
Plasma2022-10-12 00:00:00





             Test Item    Value        Reference Range Interpretation Comments

 

             Insulin [Units/volume] in Serum or 2.3 uIU/mL   2.6-24.9     L     

       



             Plasma (test code = 68681-4)                                       

 



[facility] Programpregnancy test, urine2022-10-05 
14:34:48





             Test Item    Value        Reference Range Interpretation Comments

 

             HCG (test code = HCG) negative                               



[facility] Programpregnancy test, urine2022-10-05 
14:34:48





             Test Item    Value        Reference Range Interpretation Comments

 

             HCG (test code = HCG) negative                               



[facility] Programpregnancy test, urine2022-10-05 
14:34:48





             Test Item    Value        Reference Range Interpretation Comments

 

             HCG (test code = HCG) negative                               



[facility] HospitalUrinalysis macro (dipstick) panel - 
Urine2022-10-05 14:34:43





             Test Item    Value        Reference Range Interpretation Comments

 

             Leukocytes (test code = Leukocytes) -                              

        

 

             Nitrite (test code = Nitrite) -                                    

  

 

             Urobilinogen (test code = -                                      



             Urobilinogen)                                        

 

             Protein (test code = Protein) -                                    

  

 

             pH (test code = pH) 8.0                                    

 

             Blood (test code = Blood) -                                      

 

             Specific Gravity (test code = Specific 1.015                       

           



             Gravity)                                            

 

             Ketone (test code = Ketone) -                                      

 

             Bilirubin (test code = Bilirubin) -                                

      

 

             Glucose (test code = Glucose) -                                    

  

 

             Appearance (test code = Appearance) clear                          

        

 

             [facility] (test code = Color) [facility] - 
Urine2022-10-05 14:34:43





             Test Item    Value        Reference Range Interpretation Comments

 

             Leukocytes (test code = Leukocytes) -                              

        

 

             Nitrite (test code = Nitrite) -                                    

  

 

             Urobilinogen (test code = -                                      



             Urobilinogen)                                        

 

             Protein (test code = Protein) -                                    

  

 

             pH (test code = pH) 8.0                                    

 

             Blood (test code = Blood) -                                      

 

             Specific Gravity (test code = Specific 1.015                       

           



             Gravity)                                            

 

             Ketone (test code = Ketone) -                                      

 

             Bilirubin (test code = Bilirubin) -                                

      

 

             Glucose (test code = Glucose) -                                    

  

 

             Appearance (test code = Appearance) clear                          

        

 

             Color (test code = Color) [facility]dipstick[facility] panel - 
Urine2022-10-05 14:34:43





             Test Item    Value        Reference Range Interpretation Comments

 

             Leukocytes (test code = Leukocytes) -                              

        

 

             Nitrite (test code = Nitrite) -                                    

  

 

             Urobilinogen (test code = -                                      



             Urobilinogen)                                        

 

             Protein (test code = Protein) -                                    

  

 

             pH (test code = pH) 8.0                                    

 

             Blood (test code = Blood) -                                      

 

             Specific Gravity (test code = Specific 1.015                       

           



             Gravity)                                            

 

             Ketone (test code = Ketone) -                                      

 

             Bilirubin (test code = Bilirubin) -                                

      

 

             Glucose (test code = Glucose) -                                    

  

 

             Appearance (test code = Appearance) clear                          

        

 

             Color (test code = Color) [facility] Auto Differential panel - Blood
2022 00:00:00





             Test Item    Value        Reference Range Interpretation Comments

 

             Leukocytes [#/volume] in Blood 4.3 x10e3/uL 3.4-10.8               

   



             by Automated count (test code =                                    

    



             6690-2)                                             

 

             Erythrocytes [#/volume] in 4.86 x10e6/uL 3.77-5.28                 



             Blood by Automated count (test                                     

   



             code = 789-8)                                        

 

             Hemoglobin [Mass/volume] in 13.4 g/dL    11.1-15.9                 



             Blood (test code = 718-7)                                        

 

             Hematocrit [Volume Fraction] of 42.5 %       34.0-46.6             

    



             Blood by Automated count (test                                     

   



             code = 4544-3)                                        

 

             Erythrocyte mean corpuscular 87 fL        79-97                    

 



             volume [Entitic volume] by                                        



             Automated count (test code =                                       

 



             787-2)                                              

 

             Erythrocyte mean corpuscular 27.6 pg      26.6-33.0                

 



             hemoglobin [Entitic mass] by                                       

 



             Automated count (test code =                                       

 



             785-6)                                              

 

             Erythrocyte mean corpuscular 31.5 g/dL    31.5-35.7                

 



             hemoglobin concentration                                        



             [Mass/volume] by Automated                                        



             count (test code = 786-4)                                        

 

             Erythrocyte distribution width 13.5 %       11.7-15.4              

   



             [Ratio] by Automated count                                        



             (test code = 788-0)                                        

 

             Platelets [#/volume] in Blood 211 x10e3/uL 150-450                 

  



             by Automated count (test code =                                    

    



             777-3)                                              

 

             Neutrophils/100 leukocytes in 44 %         not estab.              

  



             Blood by Automated count (test                                     

   



             code = 770-8)                                        

 

             Lymphocytes/100 leukocytes in 39 %         not estab.              

  



             Blood by Automated count (test                                     

   



             code = 736-9)                                        

 

             Monocytes/100 leukocytes in 8 %          not estab.                



             Blood by Automated count (test                                     

   



             code = 5905-5)                                        

 

             Eosinophils/100 leukocytes in 8 %          not estab.              

  



             Blood by Automated count (test                                     

   



             code = 713-8)                                        

 

             Basophils/100 leukocytes in 1 %          not estab.                



             Blood by Automated count (test                                     

   



             code = 706-2)                                        

 

             immature cells (test code = np                                     



             immature cells)                                        

 

             Neutrophils [#/volume] in Blood 1.9 x10e3/uL 1.4-7.0               

    



             by Automated count (test code =                                    

    



             751-8)                                              

 

             Lymphocytes [#/volume] in Blood 1.7 x10e3/uL 0.7-3.1               

    



             by Automated count (test code =                                    

    



             731-0)                                              

 

             Monocytes [#/volume] in Blood 0.4 x10e3/uL 0.1-0.9                 

  



             by Automated count (test code =                                    

    



             742-7)                                              

 

             Eosinophils [#/volume] in Blood 0.3 x10e3/uL 0.0-0.4               

    



             by Automated count (test code =                                    

    



             711-2)                                              

 

             Basophils [#/volume] in Blood 0.0 x10e3/uL 0.0-0.2                 

  



             by Automated count (test code =                                    

    



             704-7)                                              

 

             Immature granulocytes/100 0 %          not estab.                



             leukocytes in Blood by                                        



             Automated count (test code =                                       

 



             94273-3)                                            

 

             Immature granulocytes 0.0 x10e3/uL 0.0-0.1                   



             [#/volume] in Blood by                                        



             Automated count (test code =                                       

 



             94164-7)                                            

 

             Nucleated erythrocytes/100 np                                     



             leukocytes [Ratio] in Blood by                                     

   



             Automated count (test code =                                       

 



             18852-6)                                            

 

             Morphology [Interpretation] in np                                  

   



             Blood Narrative (test code =                                       

 



             54268-5)                                            



[facility] 2000 [facility] - 
Serum or [id number] 00:00:00





             Test Item    Value        Reference Range Interpretation Comments

 

             Glucose [Mass/volume] in 70 mg/dL     65-99                     



             Serum or Plasma (test code =                                       

 



             2345-7)                                             

 

             Urea nitrogen [Mass/volume] 12 mg/dL     6-20                      



             in Serum or Plasma (test code                                      

  



             = 3094-0)                                           

 

             Creatinine [Mass/volume] in 0.75 mg/dL   0.57-1.00                 



             Serum or Plasma (test code =                                       

 



             2160-0)                                             

 

             eGFR (test code = eGFR) 115 mL/min/1.73 >59                       

 

             Urea nitrogen/Creatinine 16           9-23                      



             [Mass Ratio] in Serum or                                        



             Plasma (test code = 3097-3)                                        

 

             Sodium [Moles/volume] in 138 mmol/L   134-144                   



             Serum or Plasma (test code =                                       

 



             2951-2)                                             

 

             Potassium [Moles/volume] in 4.2 mmol/L   3.5-5.2                   



             Serum or Plasma (test code =                                       

 



             2823-3)                                             

 

             Chloride [Moles/volume] in 101 mmol/L                       



             Serum or Plasma (test code =                                       

 



             2075-0)                                             

 

             Carbon dioxide, total 23 mmol/L    20-29                     



             [Moles/volume] in Serum or                                        



             Plasma (test code = -9)                                        

 

             Calcium [Mass/volume] in 9.5 mg/dL    8.7-10.2                  



             Serum or Plasma (test code =                                       

 



             14953-2)                                            

 

             Protein [Mass/volume] in 7.4 g/dL     6.0-8.5                   



             Serum or Plasma (test code =                                       

 



             2885-2)                                             

 

             Albumin [Mass/volume] in 4.7 g/dL     3.9-5.0                   



             Serum or Plasma (test code =                                       

 



             1751-7)                                             

 

             Globulin [Mass/volume] in 2.7 g/dL     1.5-4.5                   



             Serum by calculation (test                                        



             code = 19491-6)                                        

 

             Albumin/Globulin [Mass Ratio] 1.7          1.2-2.2                 

  



             in Serum or Plasma (test code                                      

  



             = 1759-0)                                           

 

             Bilirubin.total [Mass/volume] 1.5 mg/dL    0.0-1.2      H          

  



             in Serum or Plasma (test code                                      

  



             = 1975-2)                                           

 

             Alkaline phosphatase 51 IU/L                          



             [Enzymatic activity/volume]                                        



             in Serum or Plasma (test code                                      

  



             = 6768-6)                                           

 

             Aspartate aminotransferase 11 IU/L      0-40                      



             [Enzymatic activity/volume]                                        



             in Serum or Plasma (test code                                      

  



             = 1920-8)                                           

 

             Alanine aminotransferase 8 IU/L       0-32                      



             [Enzymatic activity/volume]                                        



             in Serum or Plasma (test code                                      

  



             = 1742-6)                                           



[facility] HospitalLip[facility] 1996 [facility] - [facility] or Plasma
2022 00:00:00





             Test Item    Value        Reference Range Interpretation Comments

 

             Cholesterol [Mass/volume] in Serum 183 mg/dL    100-199            

       



             or Plasma (test code = 2093-3)                                     

   

 

             Triglyceride [Mass/volume] in Serum 72 mg/dL     0-149             

        



             or Plasma (test code = 2571-8)                                     

   

 

             Cholesterol in HDL [Mass/volume] in 72 mg/dL     >39               

        



             Serum or Plasma (test code =                                       

 



             2085-9)                                             

 

             Cholesterol in VLDL [Mass/volume] 13 mg/dL     5-40                

      



             in Serum or Plasma by calculation                                  

      



             (test code = 00460-7)                                        

 

             Cholesterol in LDL [Mass/volume] in 98 mg/dL     0-99              

        



             Serum or Plasma by calculation                                     

   



             (test code = 28326-6)                                        

 

             Laboratory comment [Text] in Report np                             

        



             Narrative (test code = 29884-1)                                    

    



[facility] HospitalHemoglobin A1c/Hemoglobin.total in 
[id number] 00:00:00





             Test Item    Value        Reference Range Interpretation Comments

 

             Hemoglobin A1c/Hemoglobin.total in 5.3 %        4.8-5.6            

       



             Blood (test code = 4548-4)                                        



[facility]25-Hydroxy[facility]amin D3+25-
Hydroxyvitamin D2 [Mass/volume] in Serum or [id number] 00:00:00





             Test Item    Value        Reference Range Interpretation Comments

 

             25-Hydroxyvitamin 35.4 NG/mL   30.0-100.0                



             D3+25-Hydroxyvitamin D2                                        



             [Mass/volume] in Serum or Plasma                                   

     



             (test code = 48884-5)                                        



[facility] HospitalThyrotropin [Units/volume] in Serum 
or Plasma by Detection limit &lt;= 0.005 mIU/-93-17 00:00:00





             Test Item    Value        Reference Range Interpretation Comments

 

             Thyrotropin [Units/volume] in 0.446 uIU/mL 0.450-4.500  L          

  



             Serum or Plasma by Detection                                       

 



             limit <= 0.005 mIU/L (test code                                    

    



             = 16481-7)                                          

 

             Thyroxine (T4) free 1.06 NG/dL   0.82-1.77                 



             [Mass/volume] in Serum or Plasma                                   

     



             (test code = 3024-7)                                        



[facility], 
[id number] 00:00:00





             Test Item    Value        Reference Range Interpretation Comments

 

             Interpretation and review of laboratory note                       

            



             results (test code = 95649-7)                                      

  

 

             Report (test code = 35239-9) .                                     

 



[facility] Hospital[facility] Auto Differential panel - Blood
2022 00:00:00





             Test Item    Value        Reference Range Interpretation Comments

 

             Leukocytes [#/volume] in Blood 4.3 x10e3/uL 3.4-10.8               

   



             by Automated count (test code =                                    

    



             6690-2)                                             

 

             Erythrocytes [#/volume] in 4.86 x10e6/uL 3.77-5.28                 



             Blood by Automated count (test                                     

   



             code = 789-8)                                        

 

             Hemoglobin [Mass/volume] in 13.4 g/dL    11.1-15.9                 



             Blood (test code = 718-7)                                        

 

             Hematocrit [Volume Fraction] of 42.5 %       34.0-46.6             

    



             Blood by Automated count (test                                     

   



             code = 4544-3)                                        

 

             Erythrocyte mean corpuscular 87 fL        79-97                    

 



             volume [Entitic volume] by                                        



             Automated count (test code =                                       

 



             787-2)                                              

 

             Erythrocyte mean corpuscular 27.6 pg      26.6-33.0                

 



             hemoglobin [Entitic mass] by                                       

 



             Automated count (test code =                                       

 



             785-6)                                              

 

             Erythrocyte mean corpuscular 31.5 g/dL    31.5-35.7                

 



             hemoglobin concentration                                        



             [Mass/volume] by Automated                                        



             count (test code = 786-4)                                        

 

             Erythrocyte distribution width 13.5 %       11.7-15.4              

   



             [Ratio] by Automated count                                        



             (test code = 788-0)                                        

 

             Platelets [#/volume] in Blood 211 x10e3/uL 150-450                 

  



             by Automated count (test code =                                    

    



             777-3)                                              

 

             Neutrophils/100 leukocytes in 44 %         not estab.              

  



             Blood by Automated count (test                                     

   



             code = 770-8)                                        

 

             Lymphocytes/100 leukocytes in 39 %         not estab.              

  



             Blood by Automated count (test                                     

   



             code = 736-9)                                        

 

             Monocytes/100 leukocytes in 8 %          not estab.                



             Blood by Automated count (test                                     

   



             code = 5905-5)                                        

 

             Eosinophils/100 leukocytes in 8 %          not estab.              

  



             Blood by Automated count (test                                     

   



             code = 713-8)                                        

 

             Basophils/100 leukocytes in 1 %          not estab.                



             Blood by Automated count (test                                     

   



             code = 706-2)                                        

 

             immature cells (test code = np                                     



             immature cells)                                        

 

             Neutrophils [#/volume] in Blood 1.9 x10e3/uL 1.4-7.0               

    



             by Automated count (test code =                                    

    



             751-8)                                              

 

             Lymphocytes [#/volume] in Blood 1.7 x10e3/uL 0.7-3.1               

    



             by Automated count (test code =                                    

    



             731-0)                                              

 

             Monocytes [#/volume] in Blood 0.4 x10e3/uL 0.1-0.9                 

  



             by Automated count (test code =                                    

    



             742-7)                                              

 

             Eosinophils [#/volume] in Blood 0.3 x10e3/uL 0.0-0.4               

    



             by Automated count (test code =                                    

    



             711-2)                                              

 

             Basophils [#/volume] in Blood 0.0 x10e3/uL 0.0-0.2                 

  



             by Automated count (test code =                                    

    



             704-7)                                              

 

             Immature granulocytes/100 0 %          not estab.                



             leukocytes in Blood by                                        



             Automated count (test code =                                       

 



             20726-9)                                            

 

             Immature granulocytes 0.0 x10e3/uL 0.0-0.1                   



             [#/volume] in Blood by                                        



             Automated count (test code =                                       

 



             76154-3)                                            

 

             Nucleated erythrocytes/100 np                                     



             leukocytes [Ratio] in Blood by                                     

   



             Automated count (test code =                                       

 



             76421-6)                                            

 

             Morphology [Interpretation] in np                                  

   



             Blood Narrative (test code =                                       

 



             85677-7)                                            



[facility] - 
Serum or [id number] 00:00:00





             Test Item    Value        Reference Range Interpretation Comments

 

             Glucose [Mass/volume] in 70 mg/dL     65-99                     



             Serum or Plasma (test code =                                       

 



             2345-7)                                             

 

             Urea nitrogen [Mass/volume] 12 mg/dL     6-20                      



             in Serum or Plasma (test code                                      

  



             = 3094-0)                                           

 

             Creatinine [Mass/volume] in 0.75 mg/dL   0.57-1.00                 



             Serum or Plasma (test code =                                       

 



             2160-0)                                             

 

             eGFR (test code = eGFR) 115 mL/min/1.73 >59                       

 

             Urea nitrogen/Creatinine 16           9-23                      



             [Mass Ratio] in Serum or                                        



             Plasma (test code = 3097-3)                                        

 

             Sodium [Moles/volume] in 138 mmol/L   134-144                   



             Serum or Plasma (test code =                                       

 



             2951-2)                                             

 

             Potassium [Moles/volume] in 4.2 mmol/L   3.5-5.2                   



             Serum or Plasma (test code =                                       

 



             2823-3)                                             

 

             Chloride [Moles/volume] in 101 mmol/L                       



             Serum or Plasma (test code =                                       

 



             5-0)                                             

 

             Carbon dioxide, total 23 mmol/L    20-29                     



             [Moles/volume] in Serum or                                        



             Plasma (test code = -)                                        

 

             Calcium [Mass/volume] in 9.5 mg/dL    8.7-10.2                  



             Serum or Plasma (test code =                                       

 



             35240-3)                                            

 

             Protein [Mass/volume] in 7.4 g/dL     6.0-8.5                   



             Serum or Plasma (test code =                                       

 



             2885-2)                                             

 

             Albumin [Mass/volume] in 4.7 g/dL     3.9-5.0                   



             Serum or Plasma (test code =                                       

 



             1751-7)                                             

 

             Globulin [Mass/volume] in 2.7 g/dL     1.5-4.5                   



             Serum by calculation (test                                        



             code = 76826-9)                                        

 

             Albumin/Globulin [Mass Ratio] 1.7          1.2-2.2                 

  



             in Serum or Plasma (test code                                      

  



             = 1759-0)                                           

 

             Bilirubin.total [Mass/volume] 1.5 mg/dL    0.0-1.2      H          

  



             in Serum or Plasma (test code                                      

  



             = -)                                           

 

             Alkaline phosphatase 51 IU/L                          



             [Enzymatic activity/volume]                                        



             in Serum or Plasma (test code                                      

  



             = 6768-6)                                           

 

             Aspartate aminotransferase 11 IU/L      0-40                      



             [Enzymatic activity/volume]                                        



             in Serum or Plasma (test code                                      

  



             = 1920-8)                                           

 

             Alanine aminotransferase 8 IU/L       0-32                      



             [Enzymatic activity/volume]                                        



             in Serum or Plasma (test code                                      

  



             = 1742-6)                                           



[facility]
2022 00:00:00





             Test Item    Value        Reference Range Interpretation Comments

 

             Cholesterol [Mass/volume] in Serum 183 mg/dL    100-199            

       



             or Plasma (test code = 2093-3)                                     

   

 

             Triglyceride [Mass/volume] in Serum 72 mg/dL     0-149             

        



             or Plasma (test code = 2571-8)                                     

   

 

             Cholesterol in HDL [Mass/volume] in 72 mg/dL     >39               

        



             Serum or Plasma (test code =                                       

 



             2085-9)                                             

 

             Cholesterol in VLDL [Mass/volume] 13 mg/dL     5-40                

      



             in Serum or Plasma by calculation                                  

      



             (test code = 03777-1)                                        

 

             Cholesterol in LDL [Mass/volume] in 98 mg/dL     0-99              

        



             Serum or Plasma by calculation                                     

   



             (test code = 02697-5)                                        

 

             Laboratory comment [Text] in Report np                             

        



             Narrative (test code = 31281-0)                                    

    



[facility] 
[id number] 00:00:00





             Test Item    Value        Reference Range Interpretation Comments

 

             Hemoglobin A1c/Hemoglobin.total in 5.3 %        4.8-5.6            

       



             Blood (test code = 4548-4)                                        



[facility]25-Hydroxy[facility]amin D3+25-
Hydroxyvitamin D2 [Mass/volume] in Serum or [id number] 00:00:00





             Test Item    Value        Reference Range Interpretation Comments

 

             25-Hydroxyvitamin 35.4 NG/mL   30.0-100.0                



             D3+25-Hydroxyvitamin D2                                        



             [Mass/volume] in Serum or Plasma                                   

     



             (test code = 57334-6)                                        



[facility] [Units/volume] in Serum 
or Plasma by Detection limit &lt;= 0.005 mIU/-89-49 00:00:00





             Test Item    Value        Reference Range Interpretation Comments

 

             Thyrotropin [Units/volume] in 0.446 uIU/mL 0.450-4.500  L          

  



             Serum or Plasma by Detection                                       

 



             limit <= 0.005 mIU/L (test code                                    

    



             = 71966-8)                                          

 

             Thyroxine (T4) free 1.06 NG/dL   0.82-1.77                 



             [Mass/volume] in Serum or Plasma                                   

     



             (test code = 3024-7)                                        



[facility], 
[id number] 00:00:00





             Test Item    Value        Reference Range Interpretation Comments

 

             Interpretation and review of laboratory note                       

            



             results (test code = 28602-7)                                      

  

 

             Report (test code = 43465-2) .                                     

 



[facility]
2022 00:00:00





             Test Item    Value        Reference Range Interpretation Comments

 

             Leukocytes [#/volume] in Blood 4.3 x10e3/uL 3.4-10.8               

   



             by Automated count (test code =                                    

    



             6690-2)                                             

 

             Erythrocytes [#/volume] in 4.86 x10e6/uL 3.77-5.28                 



             Blood by Automated count (test                                     

   



             code = 789-8)                                        

 

             Hemoglobin [Mass/volume] in 13.4 g/dL    11.1-15.9                 



             Blood (test code = 718-7)                                        

 

             Hematocrit [Volume Fraction] of 42.5 %       34.0-46.6             

    



             Blood by Automated count (test                                     

   



             code = 4544-3)                                        

 

             Erythrocyte mean corpuscular 87 fL        79-97                    

 



             volume [Entitic volume] by                                        



             Automated count (test code =                                       

 



             787-2)                                              

 

             Erythrocyte mean corpuscular 27.6 pg      26.6-33.0                

 



             hemoglobin [Entitic mass] by                                       

 



             Automated count (test code =                                       

 



             785-6)                                              

 

             Erythrocyte mean corpuscular 31.5 g/dL    31.5-35.7                

 



             hemoglobin concentration                                        



             [Mass/volume] by Automated                                        



             count (test code = 786-4)                                        

 

             Erythrocyte distribution width 13.5 %       11.7-15.4              

   



             [Ratio] by Automated count                                        



             (test code = 788-0)                                        

 

             Platelets [#/volume] in Blood 211 x10e3/uL 150-450                 

  



             by Automated count (test code =                                    

    



             777-3)                                              

 

             Neutrophils/100 leukocytes in 44 %         not estab.              

  



             Blood by Automated count (test                                     

   



             code = 770-8)                                        

 

             Lymphocytes/100 leukocytes in 39 %         not estab.              

  



             Blood by Automated count (test                                     

   



             code = 736-9)                                        

 

             Monocytes/100 leukocytes in 8 %          not estab.                



             Blood by Automated count (test                                     

   



             code = 5905-5)                                        

 

             Eosinophils/100 leukocytes in 8 %          not estab.              

  



             Blood by Automated count (test                                     

   



             code = 713-8)                                        

 

             Basophils/100 leukocytes in 1 %          not estab.                



             Blood by Automated count (test                                     

   



             code = 706-2)                                        

 

             immature cells (test code = np                                     



             immature cells)                                        

 

             Neutrophils [#/volume] in Blood 1.9 x10e3/uL 1.4-7.0               

    



             by Automated count (test code =                                    

    



             751-8)                                              

 

             Lymphocytes [#/volume] in Blood 1.7 x10e3/uL 0.7-3.1               

    



             by Automated count (test code =                                    

    



             731-0)                                              

 

             Monocytes [#/volume] in Blood 0.4 x10e3/uL 0.1-0.9                 

  



             by Automated count (test code =                                    

    



             742-7)                                              

 

             Eosinophils [#/volume] in Blood 0.3 x10e3/uL 0.0-0.4               

    



             by Automated count (test code =                                    

    



             711-2)                                              

 

             Basophils [#/volume] in Blood 0.0 x10e3/uL 0.0-0.2                 

  



             by Automated count (test code =                                    

    



             704-7)                                              

 

             Immature granulocytes/100 0 %          not estab.                



             leukocytes in Blood by                                        



             Automated count (test code =                                       

 



             79634-7)                                            

 

             Immature granulocytes 0.0 x10e3/uL 0.0-0.1                   



             [#/volume] in Blood by                                        



             Automated count (test code =                                       

 



             44664-1)                                            

 

             Nucleated erythrocytes/100 np                                     



             leukocytes [Ratio] in Blood by                                     

   



             Automated count (test code =                                       

 



             99961-7)                                            

 

             Morphology [Interpretation] in np                                  

   



             Blood Narrative (test code =                                       

 



             66392-0)                                            



[facility] 2000 [facility] - 
Serum or [id number] 00:00:00





             Test Item    Value        Reference Range Interpretation Comments

 

             Glucose [Mass/volume] in 70 mg/dL     65-99                     



             Serum or Plasma (test code =                                       

 



             2345-7)                                             

 

             Urea nitrogen [Mass/volume] 12 mg/dL     6-20                      



             in Serum or Plasma (test code                                      

  



             = 3094-0)                                           

 

             Creatinine [Mass/volume] in 0.75 mg/dL   0.57-1.00                 



             Serum or Plasma (test code =                                       

 



             2160-0)                                             

 

             eGFR (test code = eGFR) 115 mL/min/1.73 >59                       

 

             Urea nitrogen/Creatinine 16           9-23                      



             [Mass Ratio] in Serum or                                        



             Plasma (test code = 3097-3)                                        

 

             Sodium [Moles/volume] in 138 mmol/L   134-144                   



             Serum or Plasma (test code =                                       

 



             2951-2)                                             

 

             Potassium [Moles/volume] in 4.2 mmol/L   3.5-5.2                   



             Serum or Plasma (test code =                                       

 



             2823-3)                                             

 

             Chloride [Moles/volume] in 101 mmol/L                       



             Serum or Plasma (test code =                                       

 



             2075-0)                                             

 

             Carbon dioxide, total 23 mmol/L    20-29                     



             [Moles/volume] in Serum or                                        



             Plasma (test code = -9)                                        

 

             Calcium [Mass/volume] in 9.5 mg/dL    8.7-10.2                  



             Serum or Plasma (test code =                                       

 



             06808-7)                                            

 

             Protein [Mass/volume] in 7.4 g/dL     6.0-8.5                   



             Serum or Plasma (test code =                                       

 



             2885-2)                                             

 

             Albumin [Mass/volume] in 4.7 g/dL     3.9-5.0                   



             Serum or Plasma (test code =                                       

 



             1751-7)                                             

 

             Globulin [Mass/volume] in 2.7 g/dL     1.5-4.5                   



             Serum by calculation (test                                        



             code = 50222-5)                                        

 

             Albumin/Globulin [Mass Ratio] 1.7          1.2-2.2                 

  



             in Serum or Plasma (test code                                      

  



             = 1759-0)                                           

 

             Bilirubin.total [Mass/volume] 1.5 mg/dL    0.0-1.2      H          

  



             in Serum or Plasma (test code                                      

  



             = 1975-2)                                           

 

             Alkaline phosphatase 51 IU/L                          



             [Enzymatic activity/volume]                                        



             in Serum or Plasma (test code                                      

  



             = 6768-6)                                           

 

             Aspartate aminotransferase 11 IU/L      0-40                      



             [Enzymatic activity/volume]                                        



             in Serum or Plasma (test code                                      

  



             = 1920-8)                                           

 

             Alanine aminotransferase 8 IU/L       0-32                      



             [Enzymatic activity/volume]                                        



             in Serum or Plasma (test code                                      

  



             = 1742-6)                                           



[facility]
2022 00:00:00





             Test Item    Value        Reference Range Interpretation Comments

 

             Cholesterol [Mass/volume] in Serum 183 mg/dL    100-199            

       



             or Plasma (test code = 2093-3)                                     

   

 

             Triglyceride [Mass/volume] in Serum 72 mg/dL     0-149             

        



             or Plasma (test code = 2571-8)                                     

   

 

             Cholesterol in HDL [Mass/volume] in 72 mg/dL     >39               

        



             Serum or Plasma (test code =                                       

 



             2085-9)                                             

 

             Cholesterol in VLDL [Mass/volume] 13 mg/dL     5-40                

      



             in Serum or Plasma by calculation                                  

      



             (test code = 61542-0)                                        

 

             Cholesterol in LDL [Mass/volume] in 98 mg/dL     0-99              

        



             Serum or Plasma by calculation                                     

   



             (test code = 95876-5)                                        

 

             Laboratory comment [Text] in Report np                             

        



             Narrative (test code = 52722-1)                                    

    



[facility].total in 
[id number] 00:00:00





             Test Item    Value        Reference Range Interpretation Comments

 

             Hemoglobin A1c/Hemoglobin.total in 5.3 %        4.8-5.6            

       



             Blood (test code = 4548-4)                                        



[facility]25-Hydroxy[facility]amin D3+25-
Hydroxyvitamin D2 [Mass/volume] in Serum or [id number] 00:00:00





             Test Item    Value        Reference Range Interpretation Comments

 

             25-Hydroxyvitamin 35.4 NG/mL   30.0-100.0                



             D3+25-Hydroxyvitamin D2                                        



             [Mass/volume] in Serum or Plasma                                   

     



             (test code = 94186-6)                                        



[facility] [Units/volume] in Serum 
or Plasma by Detection limit &lt;= 0.005 mIU/-18-69 00:00:00





             Test Item    Value        Reference Range Interpretation Comments

 

             Thyrotropin [Units/volume] in 0.446 uIU/mL 0.450-4.500  L          

  



             Serum or Plasma by Detection                                       

 



             limit <= 0.005 mIU/L (test code                                    

    



             = 31810-8)                                          

 

             Thyroxine (T4) free 1.06 NG/dL   0.82-1.77                 



             [Mass/volume] in Serum or Plasma                                   

     



             (test code = 3024-7)                                        



[facility], 
[id number] 00:00:00





             Test Item    Value        Reference Range Interpretation Comments

 

             Interpretation and review of laboratory note                       

            



             results (test code = 55001-5)                                      

  

 

             Report (test code = 41335-9) .                                     

 



[facility]

## 2023-09-23 VITALS — TEMPERATURE: 97.2 F

## 2023-09-23 VITALS — SYSTOLIC BLOOD PRESSURE: 120 MMHG | DIASTOLIC BLOOD PRESSURE: 81 MMHG

## 2023-09-23 VITALS — OXYGEN SATURATION: 98 %

## 2023-09-23 LAB
METHAMPHET UR QL SCN: NEGATIVE
THC SERPL-MCNC: NEGATIVE NG/ML

## 2023-09-23 NOTE — RAD REPORT
EXAM DESCRIPTION:  XR Chest, 1 View

 

CLINICAL HISTORY:  The patient is 23 years old and is Female; PALPITATIONS

 

TECHNIQUE:  Frontal view of the chest.

 

COMPARISON:  No relevant prior studies available.

 

FINDINGS:  Lungs:   Unremarkable.   No consolidation.

  Pleural space:   Unremarkable.   No pneumothorax.

  Heart:   Unremarkable.

  Mediastinum:   Unremarkable.

  Bones/joints:   No acute findings.

 

IMPRESSION:  No acute findings in the chest.

 

Electronically signed by:   Maico Gannon MD   9/23/2023 12:46 AM CDT Workstation: [id number]

 

 

 

 

Due to temporary technical issues with the PACS/Fluency reporting system, reports are being signed by
 the in house radiologists without review as a courtesy to insure prompt reporting. The interpreting 
radiologist is fully responsible for the content of the report.

## 2023-09-23 NOTE — ER
Nurse's Notes                                                                                     

 [facility]                                                                 

Name: Veronika Oliva                                                                                

Age: 23 yrs                                                                                       

Sex: Female                                                                                       

: 2000                                                                                   

MRN: [id number]                                                                                   

Arrival Date: 2023                                                                          

Time: 22:41                                                                                       

Account#: [id number]                                                                            

Bed 17                                                                                            

Private MD:                                                                                       

Diagnosis: Syncope Near;Palpitations                                                              

                                                                                                  

Presentation:                                                                                     

                                                                                             

23:04 Chief complaint: Parent and/or Guardian states: Pt was released from [facility] on  cm10

      Thursday after being there for 8 days and was started on Risperidone. Pt's mom states       

      that the medication is making her "spaced out" and today she started complaining of         

      palpitations. Chief complaint: Patient states: chest pain to the center of her chest        

      and is having some light headedness. Coronavirus screen: Vaccine status: Patient            

      reports receiving the 2nd dose of the covid vaccine. Ebola Screen: Patient denies           

      travel to an Ebola-affected area in the 21 days before illness onset. No symptoms or        

      risks identified at this time. Initial Sepsis Screen: Does the patient meet any 2           

      criteria? No. Patient's initial sepsis screen is negative. Does the patient have a          

      suspected source of infection? No. Patient's initial sepsis screen is negative. Risk        

      Assessment: Do you want to hurt yourself or someone else? Patient reports no desire to      

      harm self or others. Onset of symptoms was 2023.                              

23:04 Method Of Arrival: Ambulatory                                                           cm10

23:04 Acuity: JOHN 3                                                                           cm10

                                                                                                  

Triage Assessment:                                                                                

23:10 General: Appears in no apparent distress. comfortable, Behavior is calm, cooperative,   cm10

      flat.                                                                                       

                                                                                                  

Historical:                                                                                       

- Allergies:                                                                                      

23:10 No Known Allergies;                                                                     cm10

- Home Meds:                                                                                      

23:10 risperidone 2 mg oral tablet 2 times per day [Active];                                  cm10

                                                                                                  

- Immunization history:: Adult Immunizations.                                                     

- Social history:: Smoking status: Patient denies any tobacco usage or history of.                

                                                                                                  

                                                                                                  

Screenin/23                                                                                             

00:00 [facility] ED Fall Risk Assessment (Adult) History of falling in the last 3 months,       ha1 

      including since admission No falls in past 3 months (0 pts) Confusion or Disorientation     

      No (0 pts) Intoxicated or Sedated No (0 pts) Impaired Gait No (0 pts) Mobility Assist       

      Device Used No (0 pt) Altered Elimination No (0 pt) Score/Fall Risk Level 0 - 2 = Low       

      Risk Oriented to surroundings, Maintained a safe environment, Hourly rounding (assess       

      needs \T\ fall precautionary measures) done.                                                

01:34 Abuse screen: Denies threats or abuse. Denies injuries from another. Nutritional        ha1 

      screening: No deficits noted. Tuberculosis screening: No symptoms or risk factors           

      identified.                                                                                 

                                                                                                  

Assessment:                                                                                       

00:00 General: Appears comfortable, Behavior is calm, cooperative. Pain: Denies pain. Neuro:  ha1 

      Level of Consciousness is awake, alert, obeys commands, Oriented to person, place,          

      time, situation, Reports weakness GENERALIZED. Cardiovascular: Denies chest pain, Heart     

      tones S1 S2 present Patient's skin is warm and dry. Respiratory: Airway is patent           

      Respiratory effort is even, unlabored, Respiratory pattern is regular, symmetrical. GI:     

      No signs and/or symptoms were reported involving the gastrointestinal system. : No        

      signs and/or symptoms were reported regarding the genitourinary system. Derm: Skin is       

      moist, Skin is pink, warm \T\ dry. Musculoskeletal: Circulation, motion, and sensation      

      intact. Range of motion: intact in all extremities.                                         

00:35 Cardiovascular: Rhythm is sinus rhythm.                                                 ha1 

01:00 Reassessment: PT. REFUSED IV OR STRAIGHT STICK TO DRAW BLOOD. CARE PROVIDER EXPLAINED   ha1 

      THE IMPORTANCE OF COLLECTING BLOOD.                                                         

01:40 Reassessment: Patient and/or family updated on plan of care and expected duration. Pain ha1 

      level reassessed. Patient is alert, oriented x 3, equal unlabored respirations, skin        

      warm/dry/pink.                                                                              

01:45 Pain: Denies pain.                                                                      ha1 

                                                                                                  

Vital Signs:                                                                                      

                                                                                             

23:04  / 84; Pulse 84; Resp 18 S; Temp 97.2; Pulse Ox 97% on R/A; Weight 77.11 kg; Pain cm10

      6/10;                                                                                       

                                                                                             

00:00  / 84; Pulse 85; Resp 17 S; Pulse Ox 98% on R/A;                                  ha1 

01:00  / 81; Pulse 79; Resp 17 S; Pulse Ox 98% on R/A;                                  ha1 

                                                                                             

23:04 Pain Scale: Adult                                                                       cm10

                                                                                                  

ED Course:                                                                                        

                                                                                             

22:44 Patient arrived in ED.                                                                  jj6 

22:48 Kvng Mackey PA is PHCP.                                                                cp  

22:48 Jourdan Raya DO is Attending Physician.                                                cp  

23:10 Triage completed.                                                                       cm10

23:11 Arm band placed on Patient placed in an exam room, on a stretcher.                      cm10

23:44 UDS Sent.                                                                               cm10

23:44 PREGU Sent.                                                                             cm10

23:44 Urine Microscopic Only Sent.                                                            cm10

                                                                                             

00:00 Patient has correct armband on for positive identification. Placed in gown. Bed in low  ha1 

      position. Call light in reach. Side rails up X 1. Adult w/ patient.                         

00:00 Client placed on continuous cardiac and pulse oximetry monitoring. NIBP monitoring      ha1 

      applied.                                                                                    

00:25 XRAY Chest (1 view) In Process Unspecified.                                             EDMS

00:51 Nga Banks, RN is Primary Nurse.                                                      ha1 

01:41 No provider procedures requiring assistance completed. Patient did not have IV access   ha1 

      during this emergency room visit. Patient maintains SpO2 saturation greater than 95% on     

      room air.                                                                                   

01:43 Provided Education on: THE IMPORTANCE TO COLLECT BLOOD..                                ha1 

                                                                                                  

Administered Medications:                                                                         

No medications were administered                                                                  

                                                                                                  

                                                                                                  

Medication:                                                                                       

01:41 VIS not applicable for this client.                                                     ha1 

                                                                                                  

Outcome:                                                                                          

01:26 Discharge ordered by MD.                                                                antonella  

01:41 Discharged to home ambulatory, with family,                                             ha1 

01:41 Condition: stable                                                                           

01:41 Discharge instructions given to patient, family, Instructed on discharge instructions,      

      follow up and referral plans. Demonstrated understanding of instructions, follow-up         

      care,                                                                                       

01:45 Patient left the ED.                                                                    ha1 

                                                                                                  

Signatures:                                                                                       

Dispatcher [employer]                           EDMS                                                 

Kvng Mackey PA PA cp Jeffries, Jennifer                           jj6                                                  

Nga Banks, RN                        RN   ha1                                                  

Yoselyn Baumann RN                  RN   10                                                 

                                                                                                  

**************************************************************************************************

## 2023-09-23 NOTE — EDPHYS
Physician Documentation                                                                           

 [facility]                                                                 

Name: Veronika Oliva                                                                                

Age: 23 yrs                                                                                       

Sex: Female                                                                                       

: 2000                                                                                   

MRN: [id number]                                                                                   

Arrival Date: 2023                                                                          

Time: 22:41                                                                                       

Account#: [id number]                                                                            

Bed 17                                                                                            

Private MD:                                                                                       

ED Physician Jourdan Raya                                                                         

HPI:                                                                                              

                                                                                             

23:20 This 23 yrs old Black Female presents to ER via Ambulatory with complaints of Irregular cp  

      Pulse, Near Syncope.                                                                        

23:30 The patient presents with a history of heart racing. Context: The symptoms occur at     cp  

      rest.                                                                                       

23:30 Onset: The symptoms/episode began/occurred today. Duration: The patient or guardian     cp  

      reports a single episode. Patient accompanied by grandmother to ED who reports patient      

      recent discharge from [facility]. Recently started on Risperidone. Grandmother          

      concerned about medication effects. Patient seemed "spaced out" today and c/o heart         

      racing. Appeared to almost lose consciousness.                                              

                                                                                                  

Historical:                                                                                       

- Allergies:                                                                                      

23:10 No Known Allergies;                                                                     cm10

- Home Meds:                                                                                      

23:10 risperidone 2 mg oral tablet 2 times per day [Active];                                  cm10

                                                                                                  

- Immunization history:: Adult Immunizations.                                                     

- Social history:: Smoking status: Patient denies any tobacco usage or history of.                

                                                                                                  

                                                                                                  

ROS:                                                                                              

23:25 Constitutional: Negative for body aches, chills, fever, poor PO intake,                 cp  

23:25 Eyes: Negative for injury, pain, redness, and discharge,                                cp  

23:25 ENT: Negative for drainage from ear(s), ear pain, sore throat, difficulty swallowing,       

      difficulty handling secretions,                                                             

23:25 Cardiovascular: Positive for palpitations, Negative for chest pain,                         

23:25 Respiratory: Negative for cough, shortness of breath, sputum production,                    

23:25 Abdomen/GI: Negative for abdominal pain, nausea, vomiting, and diarrhea,                    

23:25 Neuro: Positive for near syncope, Negative for altered mental status, headache,             

23:25 All other systems are negative,                                                             

                                                                                                  

Exam:                                                                                             

23:25 ECG was reviewed by the Attending Physician.                                            cp  

23:30 Constitutional: The patient appears in no acute distress, alert, awake, comfortable,    cp  

      non-diaphoretic, non-toxic, well developed, well nourished,                                 

23:30 Head/Face:  Normocephalic, atraumatic.                                                  cp  

23:30 Eyes: Pupils: equal, round, and reactive to light and accomodation, Conjunctiva:            

      normal, no exudate, no injection, Sclera: no appreciated abnormality, Lids and lashes:      

      appear normal, bilaterally,                                                                 

23:30 ENT: External ear(s): are unremarkable, Nose: is normal, Mouth: Lips: moist, Oral           

      mucosa: pink and intact, moist, Posterior pharynx: is normal, airway is patent, no          

      erythema, no exudate,                                                                       

23:30 Neck: ROM/movement: is normal, is supple, without pain, no range of motions                 

      limitations,                                                                                

23:30 Chest/axilla: Inspection: normal,                                                           

23:30 Cardiovascular: Rate: normal, Rhythm: regular, Edema: is not appreciated, JVD: is not       

      appreciated,                                                                                

23:30 Respiratory: the patient does not display signs of respiratory distress,  Respirations:     

      normal, no use of accessory muscles, no retractions, labored breathing, is not present,     

      Breath sounds: are clear throughout, no decreased breath sounds, no stridor, no             

      wheezing,                                                                                   

23:30 Abdomen/GI: Inspection: abdomen appears normal,                                             

23:30 Neuro: Orientation: no acute changes, Mentation: able to follow commands, slow to           

      respond, Motor: moves all fours, strength is normal,                                        

                                                                                                  

Vital Signs:                                                                                      

23:04  / 84; Pulse 84; Resp 18 S; Temp 97.2; Pulse Ox 97% on R/A; Weight 77.11 kg; Pain cm10

      6/10;                                                                                       

                                                                                             

00:00  / 84; Pulse 85; Resp 17 S; Pulse Ox 98% on R/A;                                  ha1 

01:00  / 81; Pulse 79; Resp 17 S; Pulse Ox 98% on R/A;                                  ha1 

                                                                                             

23:04 Pain Scale: Adult                                                                       cm10

                                                                                                  

MDM:                                                                                              

                                                                                             

23:23 Patient medically screened.                                                             cp  

23:30 Differential diagnosis: arrythmia, dehydration, medication reaction.                    cp  

                                                                                             

01:25 Data reviewed: vital signs, nurses notes, EKG.                                            

:25 Test considered but Not performed: Labs: refused by patient. Historians other than the  cp  

      Patient: Family Member: grandmother. Counseling: I had a detailed discussion with the       

      patient and/or guardian regarding the historical points, exam findings, and any             

      diagnostic results supporting the discharge/admit diagnosis, the need for outpatient        

      follow up, a family practitioner, to return to the emergency department if symptoms         

      worsen or persist or if there are any questions or concerns that arise at home.             

                                                                                                  

                                                                                             

23:15 Order name: Urine Microscopic Only; Complete Time: :                                cp  

                                                                                             

23:15 Order name: PREGU; Complete Time: :                                                 cp  

                                                                                             

23:16 Order name: UDS; Complete Time:                                                    cp  

                                                                                             

00:04 Order name: Urine Culture                                                               EDMS

                                                                                             

23:15 Order name: XRAY Chest (1 view)                                                           

                                                                                             

23:15 Order name: EKG; Complete Time: 23:15                                                   cp  

                                                                                             

23:15 Order name: Cardiac monitoring; Complete Time: :                                    cp  

                                                                                             

23:15 Order name: EKG - Nurse/Tech; Complete Time: :                                      cp  

                                                                                             

23:15 Order name: O2 Per Protocol; Complete Time:                                        cp  

                                                                                             

23:15 Order name: O2 Sat Monitoring; Complete Time:                                      cp  

                                                                                                  

EC/22                                                                                             

23:25 Rate is 84 beats/min. Rhythm is regular. GA interval is normal. QRS interval is normal. cp  

      QT interval is normal. T waves are Inverted in leads III, aVR. Interpreted by me.           

      Reviewed by me.                                                                             

                                                                                                  

Administered Medications:                                                                         

No medications were administered                                                                  

                                                                                                  

                                                                                                  

Disposition:                                                                                      

                                                                                             

02:25 Co-signature as Attending Physician, Jourdan Raya DO I was immediately available on-site ms3 

      in the Emergency Department for consultation in the care of the patient.                    

                                                                                                  

Disposition Summary:                                                                              

23 01:26                                                                                    

Discharge Ordered                                                                                 

 Notes:       Location: Home                                                                        
  cp

      Problem: new                                                                            cp  

      Symptoms: have improved                                                                 cp  

      Condition: Stable                                                                       cp  

      Diagnosis                                                                                   

        - Syncope Near                                                                        cp  

        - Palpitations                                                                        cp  

      Followup:                                                                               cp  

        - With: Private Physician                                                                  

        - When: 2 - 3 days                                                                         

        - Reason: Recheck today's complaints                                                       

      Discharge Instructions:                                                                     

        - Discharge Summary Sheet                                                             cp  

        - Near-Syncope                                                                        cp  

        - Palpitations                                                                        cp  

      Forms:                                                                                      

        - Medication Reconciliation Form                                                      cp  

        - Thank You Letter                                                                    cp  

        - Antibiotic Education                                                                cp  

        - Prescription Opioid Use                                                             cp  

        - Patient Portal Instructions                                                         cp  

        - Leadership Thank You Letter                                                         cp  

Signatures:                                                                                       

Dispatcher [employer]                           EDMS                                                 

Kvng Mackey PA PA cp Sims, Marcus, DO DO   ms3                                                  

Yoselyn Baumann RN                  RN   cm10                                                 

                                                                                                  

Corrections: (The following items were deleted from the chart)                                    

01: 23:15 IV Saline Lock ordered. cp                                                  ha1 

                                                                                             

01: 23:15 Labs collected and sent ordered. cp                                         ha1 

                                                                                                  

**************************************************************************************************

## 2023-09-25 NOTE — EKG
Test Date:    2023-09-22               Test Time:    23:19:48

Technician:   JANE                                     

                                                     

MEASUREMENT RESULTS:                                       

Intervals:                                           

Rate:         84                                     

IL:           132                                    

QRSD:         74                                     

QT:           352                                    

QTc:          415                                    

Axis:                                                

P:            62                                     

IL:           132                                    

QRS:          57                                     

T:            24                                     

                                                     

INTERPRETIVE STATEMENTS:                                       

                                                     

Normal sinus rhythm

Nonspecific T wave abnormality

Abnormal ECG

No previous ECG available for comparison



Electronically Signed On 09-25-23 12:31:45 CDT by Neal Almanzar